# Patient Record
Sex: MALE | Race: WHITE | NOT HISPANIC OR LATINO | Employment: FULL TIME
[De-identification: names, ages, dates, MRNs, and addresses within clinical notes are randomized per-mention and may not be internally consistent; named-entity substitution may affect disease eponyms.]

---

## 2022-06-08 ENCOUNTER — TRANSCRIBE ORDERS (OUTPATIENT)
Dept: OTHER | Age: 52
End: 2022-06-08
Payer: COMMERCIAL

## 2022-06-08 DIAGNOSIS — G35 MS (MULTIPLE SCLEROSIS) (H): Primary | ICD-10-CM

## 2022-08-15 ENCOUNTER — OFFICE VISIT (OUTPATIENT)
Dept: NEUROLOGY | Facility: CLINIC | Age: 52
End: 2022-08-15
Attending: PSYCHIATRY & NEUROLOGY
Payer: COMMERCIAL

## 2022-08-15 VITALS — HEART RATE: 71 BPM | DIASTOLIC BLOOD PRESSURE: 75 MMHG | SYSTOLIC BLOOD PRESSURE: 122 MMHG

## 2022-08-15 DIAGNOSIS — E53.8 FOLATE DEFICIENCY: ICD-10-CM

## 2022-08-15 DIAGNOSIS — E55.9 VITAMIN D DEFICIENCY: ICD-10-CM

## 2022-08-15 DIAGNOSIS — G35 MS (MULTIPLE SCLEROSIS) (H): Primary | ICD-10-CM

## 2022-08-15 DIAGNOSIS — R53.82 CHRONIC FATIGUE: ICD-10-CM

## 2022-08-15 PROCEDURE — 2894A VOIDCORRECT: CPT | Performed by: PSYCHIATRY & NEUROLOGY

## 2022-08-15 PROCEDURE — 99215 OFFICE O/P EST HI 40 MIN: CPT | Performed by: PSYCHIATRY & NEUROLOGY

## 2022-08-15 RX ORDER — FOLIC ACID 1 MG/1
1000 TABLET ORAL DAILY
Qty: 30 TABLET | Refills: 4 | Status: SHIPPED | OUTPATIENT
Start: 2022-08-15 | End: 2022-08-15

## 2022-08-15 RX ORDER — LEVOTHYROXINE SODIUM 75 UG/1
75 TABLET ORAL DAILY
COMMUNITY
Start: 2022-07-28

## 2022-08-15 RX ORDER — FINGOLIMOD HCL 0.5 MG/1
0.5 CAPSULE ORAL DAILY
Qty: 30 CAPSULE | Refills: 11 | Status: SHIPPED | OUTPATIENT
Start: 2022-08-15 | End: 2023-03-17

## 2022-08-15 RX ORDER — FINGOLIMOD HCL 0.5 MG/1
0.5 CAPSULE ORAL DAILY
COMMUNITY
Start: 2022-08-03 | End: 2022-08-15

## 2022-08-15 RX ORDER — IBUPROFEN 600 MG/1
TABLET, FILM COATED ORAL
COMMUNITY
Start: 2021-12-02 | End: 2022-12-16

## 2022-08-15 RX ORDER — ACETAMINOPHEN 500 MG
TABLET ORAL
COMMUNITY
Start: 2021-09-30 | End: 2022-12-16

## 2022-08-15 RX ORDER — PREGABALIN 25 MG/1
CAPSULE ORAL
COMMUNITY
Start: 2021-11-29 | End: 2022-09-16

## 2022-08-15 RX ORDER — FOLIC ACID 1 MG/1
1000 TABLET ORAL DAILY
Qty: 30 TABLET | Refills: 4 | Status: SHIPPED | OUTPATIENT
Start: 2022-08-15 | End: 2023-01-04

## 2022-08-15 RX ORDER — FOLIC ACID 1 MG/1
1000 TABLET ORAL DAILY
COMMUNITY
Start: 2022-06-05 | End: 2022-08-15

## 2022-08-15 NOTE — PROGRESS NOTES
"Date of Service: 8/15/2022    [facility] Neurology   MS Clinic Follow-up     Subjective: 51-year-old man with hypothyroidism, venous insufficiency who presents in follow-up for multiple sclerosis.  He is accompanied by his wife, but provides history independently.    He has continued to take Gilenya.  He does not report any substantial side effects from the medication.  However his wife does notice a cough.  This is intermittent throughout the day.    He has not struggled with recurrent illnesses.  He has not had COVID.  He has not been able to complete the COVID vaccination series.  Recall that he had new onset of left upper extremity symptoms following the first dose of his Moderna vaccination.    He continues to have dysesthesias in the left upper extremity.  This is an intermittent feeling that he has a blood pressure cuff on the forearm.  This is becoming less frequent with time.  His left hand feels asleep.  His left hand strength has improved from a 5 pound  to a 63 pound .  However this is substantially weaker than his right.  He is able to type okay.    He is taking Lyrica 50 mg twice per day and wonders if he still needs this medication.  His current symptoms are manageable.    His wife reports that his energy level is lower.  He does experience some irritability.  He was diagnosed with mild sleep apnea, but has not pursued treatment.      Disease onset: age 50, LUE dysesthesias  Last relapse: \"    DMD hx:   gilenya 8/2021-present    No Known Allergies    Current Outpatient Medications   Medication     acetaminophen (TYLENOL) 500 MG tablet     folic acid (FOLVITE) 1 MG tablet     GILENYA 0.5 MG capsule     ibuprofen (ADVIL/MOTRIN) 600 MG tablet     levothyroxine (SYNTHROID/LEVOTHROID) 75 MCG tablet     pregabalin (LYRICA) 25 MG capsule     vitamin D3 (CHOLECALCIFEROL) 1.25 MG (08301 UT) capsule     No current facility-administered medications for this visit.        Past medical, surgical, social and " family history was personally reviewed. Pertinent details noted above.     Physical Examination:   /75 (BP Location: Right arm, Patient Position: Sitting)   Pulse 71     General: no acute distress  Cranial nerves:   VFFC  PERRL w/no RAPD  EOM full w/no HEATHER   Face symmetric  Hearing intact  No dysarthria   Motor:   Tone is normal   Bulk is normal     R L  Deltoid  5 5  Biceps  5 5  Triceps 5 5  Wrist ext 5 5  Finger ext 5 4 *limited by pain  Finger abd 5 5    Hip flexion 5 5  Knee flexion 5 5  Knee ext 5 5  Ankle d/f 5 5    Reflexes: 2+ UE and patella, 1+ achilles, babinski absent bilaterally  Sensory: vibration is mod reduced in the right toe and ankle, mild knee, mild red L toe, JPS normal in the toes   Romberg is absent  Coordination: no ataxia or dysmetria  Gait: normal base and stride, tandem gait is intact, able to balance on one foot for 5 seconds    Tests/Imaging:   CSF neg ocb    CECIL virus Ab positive  Vitamin D 66  Folate 3    Abs lymph 490 - 7/7/2022  lft's wnl     MRI brain   2021 - personal review, multiple periventricular and juxtacortical lesions, gd-   12/2021 - no new lesions, gd-     MRI cervical spine   2021 - multiple lesions in cervical cord at the level of c3, left lat/dorsal cord lesion is gd+   12/2021 - no new lesions, gd-, decreased size of lesion at c3    MRI thoracic spine   2021 - remarkable for 4 lesions, gd-       Assessment: 51-year-old man with relapsing remitting multiple sclerosis who appears to be clinically stable on Gilenya.  He is due for radiologic surveillance in December of this year.  Blood work was reviewed and reveals no evidence of toxicity.    We discussed a number of lifestyle changes that can be helpful for multiple sclerosis.  This includes dietary changes, benefits of exercise, and the benefit of addressing obstructive sleep apnea.    He may benefit from visiting with a therapist.  This will be discussed again at his next appointment.    Plan:   -Continue  Gilenya  - Continue vitamin D  - Gradually reduce Lyrica by 1 capsule/day, though may resume if neuropathic pain worsens with decreased use  - Continue folic acid  - Follow-up after MRI is completed in December    Note was completed with the assistance of Dragon Fluency software which can often result in accidental word substitutions.     A total of 50 minutes on the date of service were spent in the care of this patient.   Lorelei Mendez MD on 8/15/2022 at 9:01 AM

## 2022-08-15 NOTE — PATIENT INSTRUCTIONS
Continue gilenya     MRI in December    Continue vitamin D and folic acid    Lyrica - reduce by 1 capsule every week  Let me know if you end up needing a new prescription    Follow up after MRI         Covid vaccine - try to get pfizer   Okay to get shingrix

## 2022-08-15 NOTE — NURSING NOTE
Chief Complaint   Patient presents with     Multiple Sclerosis     Referred by CHASE Bailey on 8/15/2022 at 9:01 AM

## 2022-08-28 ENCOUNTER — HEALTH MAINTENANCE LETTER (OUTPATIENT)
Age: 52
End: 2022-08-28

## 2022-12-07 ENCOUNTER — TELEPHONE (OUTPATIENT)
Dept: NEUROLOGY | Facility: CLINIC | Age: 52
End: 2022-12-07

## 2022-12-07 NOTE — TELEPHONE ENCOUNTER
Pharmacy wondering if pt can switch to generic of gilenya? Would be no copay. Rx as written is for brand name only.     Please advise    Luis Singh RN, BSN  [facility]rology

## 2022-12-07 NOTE — TELEPHONE ENCOUNTER
[facility] Call Center    Phone Message    May a detailed message be left on voicemail: yes     Reason for Call: Other: Jb from [employer] needs the prior authorization number for the two MRI's. The Brain MRI and Cervical Spine. Please call her at [phone]    Action Taken: Message routed to:  Other: neurology    Travel Screening: Not Applicable

## 2022-12-07 NOTE — TELEPHONE ENCOUNTER
Returned call to [facility] specialty pharmacy. Spoke to pharmacist Jeannine and informed that it is ok to dispense generic Gilenya.     Luis Singh, RN, BSN  [facility]

## 2022-12-07 NOTE — TELEPHONE ENCOUNTER
Pls call pharmacist Roman re RX for Jelinia (SP?) RX on file is for 5 mg, Brand name only. Is it OK for Dr Mendez to have them fill with a generic? It is FDA approved, been on the market for a couple months now and cost out of pocket to patient would be zero.  Pt reference number at pharmacy is [id number]  Pls call Roman at private voice mail [phone]  Or a colleague can be reached at [phone] ext [phone]

## 2022-12-07 NOTE — TELEPHONE ENCOUNTER
HARPREET for Jb ([facility] [employer]) with MRI authorization information below.     Auth Number:                          [id number]  Auth Date Range:                   12/06/22 - 02/03/23    Luis Singh RN, BSN  [facility]

## 2022-12-14 ENCOUNTER — HOSPITAL ENCOUNTER (OUTPATIENT)
Dept: MRI IMAGING | Facility: CLINIC | Age: 52
Discharge: HOME | End: 2022-12-14
Attending: PSYCHIATRY & NEUROLOGY
Payer: COMMERCIAL

## 2022-12-14 DIAGNOSIS — G35 MS (MULTIPLE SCLEROSIS) (H): ICD-10-CM

## 2022-12-14 PROCEDURE — 255N000002 HC RX 255 OP 636: Performed by: PSYCHIATRY & NEUROLOGY

## 2022-12-14 PROCEDURE — A9585 GADOBUTROL INJECTION: HCPCS | Performed by: PSYCHIATRY & NEUROLOGY

## 2022-12-14 PROCEDURE — 70553 MRI BRAIN STEM W/O & W/DYE: CPT

## 2022-12-14 RX ORDER — GADOBUTROL 604.72 MG/ML
10 INJECTION INTRAVENOUS ONCE
Status: COMPLETED | OUTPATIENT
Start: 2022-12-14 | End: 2022-12-14

## 2022-12-14 RX ADMIN — GADOBUTROL 10 ML: 604.72 INJECTION INTRAVENOUS at 09:47

## 2022-12-15 ENCOUNTER — TELEPHONE (OUTPATIENT)
Dept: NEUROLOGY | Facility: CLINIC | Age: 52
End: 2022-12-15

## 2022-12-15 NOTE — TELEPHONE ENCOUNTER
----- Message from Lorelei Mendez MD sent at 12/15/2022  1:17 PM CST -----  Can you assist in getting the prior mri brain into our pacs? I believe he has gone to [facility] - though I could be wrong Thanks, Lorelei Mendez MD on 12/15/2022 at 1:16 PM

## 2022-12-15 NOTE — RESULT ENCOUNTER NOTE
Can you assist in getting the prior mri brain into our pacs? I believe he has gone to [facility] - though I could be wrong Thanks, Lorelei Mendez MD on 12/15/2022 at 1:16 PM

## 2022-12-15 NOTE — TELEPHONE ENCOUNTER
Called [facility] in [place], [place] and spoke to Medical Records [phone]. Asked for [facility] medical records to check to see if pt had his brain MRI done there and per them, yes. Pt had 2 brain MRI done, one in June 2021 and the other in Dec 2021. I've asked them to push the images to pacs and to fax us the reports to fax [phone].    Still waiting on the images/reports from [facility]. Once images are pushed, will forward message to Dr. Mendez to let her know.    CHASE Alcantar on 12/15/2022 at 2:14 PM

## 2022-12-16 ENCOUNTER — LAB (OUTPATIENT)
Dept: LAB | Facility: CLINIC | Age: 52
End: 2022-12-16
Payer: COMMERCIAL

## 2022-12-16 ENCOUNTER — MEDICAL CORRESPONDENCE (OUTPATIENT)
Dept: NEUROLOGY | Facility: CLINIC | Age: 52
End: 2022-12-16

## 2022-12-16 ENCOUNTER — OFFICE VISIT (OUTPATIENT)
Dept: NEUROLOGY | Facility: CLINIC | Age: 52
End: 2022-12-16
Attending: PSYCHIATRY & NEUROLOGY
Payer: COMMERCIAL

## 2022-12-16 VITALS
SYSTOLIC BLOOD PRESSURE: 146 MMHG | DIASTOLIC BLOOD PRESSURE: 86 MMHG | HEART RATE: 65 BPM | WEIGHT: 315 LBS | OXYGEN SATURATION: 98 %

## 2022-12-16 DIAGNOSIS — E55.9 VITAMIN D DEFICIENCY: ICD-10-CM

## 2022-12-16 DIAGNOSIS — G35 MS (MULTIPLE SCLEROSIS) (H): ICD-10-CM

## 2022-12-16 DIAGNOSIS — G35 MS (MULTIPLE SCLEROSIS) (H): Primary | ICD-10-CM

## 2022-12-16 DIAGNOSIS — Z51.81 THERAPEUTIC DRUG MONITORING: ICD-10-CM

## 2022-12-16 LAB
BASOPHILS # BLD AUTO: 0.1 10E3/UL (ref 0–0.2)
BASOPHILS NFR BLD AUTO: 1 %
DEPRECATED CALCIDIOL+CALCIFEROL SERPL-MC: 66 UG/L (ref 20–75)
EOSINOPHIL # BLD AUTO: 0.1 10E3/UL (ref 0–0.7)
EOSINOPHIL NFR BLD AUTO: 1 %
ERYTHROCYTE [DISTWIDTH] IN BLOOD BY AUTOMATED COUNT: 12.6 % (ref 10–15)
HBV CORE AB SERPL QL IA: NONREACTIVE
HBV SURFACE AB SERPL IA-ACNC: 0.24 M[IU]/ML
HBV SURFACE AB SERPL IA-ACNC: NONREACTIVE M[IU]/ML
HBV SURFACE AG SERPL QL IA: NONREACTIVE
HCT VFR BLD AUTO: 45.3 % (ref 40–53)
HGB BLD-MCNC: 15.4 G/DL (ref 13.3–17.7)
IMM GRANULOCYTES # BLD: 0 10E3/UL
IMM GRANULOCYTES NFR BLD: 1 %
LYMPHOCYTES # BLD AUTO: 0.8 10E3/UL (ref 0.8–5.3)
LYMPHOCYTES NFR BLD AUTO: 12 %
MCH RBC QN AUTO: 32.7 PG (ref 26.5–33)
MCHC RBC AUTO-ENTMCNC: 34 G/DL (ref 31.5–36.5)
MCV RBC AUTO: 96 FL (ref 78–100)
MONOCYTES # BLD AUTO: 0.9 10E3/UL (ref 0–1.3)
MONOCYTES NFR BLD AUTO: 13 %
NEUTROPHILS # BLD AUTO: 4.8 10E3/UL (ref 1.6–8.3)
NEUTROPHILS NFR BLD AUTO: 72 %
NRBC # BLD AUTO: 0 10E3/UL
NRBC BLD AUTO-RTO: 0 /100
PLATELET # BLD AUTO: 307 10E3/UL (ref 150–450)
RBC # BLD AUTO: 4.71 10E6/UL (ref 4.4–5.9)
WBC # BLD AUTO: 6.6 10E3/UL (ref 4–11)

## 2022-12-16 PROCEDURE — 86704 HEP B CORE ANTIBODY TOTAL: CPT | Performed by: PATHOLOGY

## 2022-12-16 PROCEDURE — 99215 OFFICE O/P EST HI 40 MIN: CPT | Performed by: PSYCHIATRY & NEUROLOGY

## 2022-12-16 PROCEDURE — 86706 HEP B SURFACE ANTIBODY: CPT | Performed by: PATHOLOGY

## 2022-12-16 PROCEDURE — 82784 ASSAY IGA/IGD/IGG/IGM EACH: CPT | Performed by: PATHOLOGY

## 2022-12-16 PROCEDURE — 99000 SPECIMEN HANDLING OFFICE-LAB: CPT | Performed by: PATHOLOGY

## 2022-12-16 PROCEDURE — 82306 VITAMIN D 25 HYDROXY: CPT | Performed by: PATHOLOGY

## 2022-12-16 PROCEDURE — 85025 COMPLETE CBC W/AUTO DIFF WBC: CPT | Performed by: PATHOLOGY

## 2022-12-16 PROCEDURE — 87340 HEPATITIS B SURFACE AG IA: CPT | Performed by: PATHOLOGY

## 2022-12-16 PROCEDURE — 99211 OFF/OP EST MAY X REQ PHY/QHP: CPT | Performed by: PSYCHIATRY & NEUROLOGY

## 2022-12-16 PROCEDURE — 86787 VARICELLA-ZOSTER ANTIBODY: CPT | Performed by: PATHOLOGY

## 2022-12-16 PROCEDURE — 36415 COLL VENOUS BLD VENIPUNCTURE: CPT | Performed by: PATHOLOGY

## 2022-12-16 ASSESSMENT — PAIN SCALES - GENERAL: PAINLEVEL: NO PAIN (0)

## 2022-12-16 NOTE — TELEPHONE ENCOUNTER
Dr. Mendez,     Images were pushed to pacs from [facility].     We received the MRI reports as well and I've asked Carolann to scan the reports into the pt's chart for these MRIs are also scanned into the pt's chart as well.    Just a FYI, when you are searching for the pt in pacs, the last name should be put in as O'Malik and not Cristina (there is a apostrophe in his last name per [facility] records).    Please let me know if you are not able to pull him up. Thank you.    CHASE Alcantar on 12/16/2022 at 8:32 AM

## 2022-12-16 NOTE — LETTER
"12/16/2022       RE: Jace Cristina  [address] [address]     Dear Colleague,    Thank you for referring your patient, Jace Cristina, to the [facility] MULTIPLE SCLEROSIS CLINIC [place] at [facility]. Please see a copy of my visit note below.    Date of Service: 12/16/2022    [facility] Neurology   MS Clinic Follow-up     Subjective: 52-year-old man with hypothyroidism, venous insufficiency who presents in follow-up for multiple sclerosis.  He is accompanied by his wife, Lesa, but provides history independently.    He does not report any new symptoms concerning for an MS relapse.    He has continued to take Gilenya.  He denies any side effects.  He has not had any issues obtaining the medication.  There have been no interruptions in his therapy.    He has residual left upper extremity dysesthesias.  Recall that this happened after receiving the Moderna vaccination.    He has not had any recent illnesses.  He has not had COVID.    Disease onset: age 50, LUE dysesthesias  Last relapse: \"    DMD hx:   gilenya 8/2021-present    No Known Allergies    Current Outpatient Medications   Medication     folic acid (FOLVITE) 1 MG tablet     GILENYA 0.5 MG capsule     levothyroxine (SYNTHROID/LEVOTHROID) 75 MCG tablet     pregabalin (LYRICA) 50 MG capsule     vitamin D3 (CHOLECALCIFEROL) 1.25 MG (28418 UT) capsule     acetaminophen (TYLENOL) 500 MG tablet     diazepam (VALIUM) 5 MG tablet     ibuprofen (ADVIL/MOTRIN) 600 MG tablet     No current facility-administered medications for this visit.        Past medical, surgical, social and family history was personally reviewed. Pertinent details noted above.     Physical Examination:   BP (!) 146/86 (BP Location: Right arm, Patient Position: Sitting, Cuff Size: Adult Regular)   Pulse 65   Wt 145.2 kg (320 lb)   SpO2 98%     General: no acute distress  Cranial nerves:   VFFC  PERRL w/no RAPD  EOM full w/no " HEATHER   Face symmetric  Hearing intact  No dysarthria   Motor:   Tone is normal   Bulk is normal     R L  Deltoid  5 5  Biceps  5 5  Triceps 5 5  Wrist ext 5 5  Finger ext 5 5  Finger abd 5 5-    Hip flexion 5 5  Knee flexion 5 5  Knee ext 5 5  Ankle d/f 5 5    Reflexes: 2+ UE and patella, 1+ achilles, babinski absent bilaterally  Sensory: vibration is mod reduced in the right toe and ankle, mild red L toe, JPS normal in the toes   Romberg is absent  Coordination: no ataxia or dysmetria  Gait: normal base and stride, tandem gait is intact, able to balance on one foot for 5 seconds    Tests/Imaging:   CSF neg ocb  Serum mog neg    CECIL virus Ab positive  Vitamin D 68.5  Folate 3    Abs lymph 490 - 7/7/2022  lft's wnl     MRI brain   2021 - personal review, multiple periventricular and juxtacortical lesions, gd-   12/2021 - no new lesions, gd-   12/2022 - 3 new lesions, 1 faint gd+    MRI cervical spine   2021 - multiple lesions in cervical cord at the level of c3, left lat/dorsal cord lesion is gd+   12/2021 - no new lesions, gd-, decreased size of lesion at c3    MRI thoracic spine   2021 - remarkable for 4 lesions, gd-       Assessment: 52-year-old man with relapsing remitting multiple sclerosis who has been clinically stable on Gilenya, but does have evidence of radiologic progression.  Given the presence of 3 new lesions, 1 of which does take up contrast, I have recommended that he switch disease modifying therapies.    We discussed risks and benefits of an anti-CD20 therapy.  He was agreeable to switching to ocrelizumab.    We discussed risk of medication.  He was encouraged to get a flu vaccination and his Shingrix vaccination.  Recall that he had the reaction to the COVID vaccination in the past.  I have therefore recommended that he receive evusheld.    He will try to come off of pregabalin again, but if there are recurrence of dysesthesias he will remain on 50 mg twice per day.    Plan:   -Baseline blood work  for Ocrevus  - Complete MRI of the cervical and thoracic spine  - Stop Gilenya 2 weeks before first dose of Ocrevus  - Follow-up in 3 to 4 months    Note was completed with the assistance of Dragon Fluency software which can often result in accidental word substitutions.     A total of 40 minutes on the date of service were spent in the care of this patient.     Lorelei Mendez MD on 12/16/2022 at 10:52 AM

## 2022-12-16 NOTE — PATIENT INSTRUCTIONS
Your MRI brain was remarkable for 3 new lesions  One was enhancing     Shingrix, flu vaccines now     You are a candidate for evusheld    Start approval process for ocrevus   Baseline bloodwork today     You will stop gilenya 2 weeks before your first dose of ocrevus    Complete updated MRI cervical spine and thoracic spine     Follow up in 3-4 months

## 2022-12-16 NOTE — PROGRESS NOTES
"Date of Service: 12/16/2022    [facility] Neurology   MS Clinic Follow-up     Subjective: 52-year-old man with hypothyroidism, venous insufficiency who presents in follow-up for multiple sclerosis.  He is accompanied by his wife, Lesa, but provides history independently.    He does not report any new symptoms concerning for an MS relapse.    He has continued to take Gilenya.  He denies any side effects.  He has not had any issues obtaining the medication.  There have been no interruptions in his therapy.    He has residual left upper extremity dysesthesias.  Recall that this happened after receiving the Moderna vaccination.    He has not had any recent illnesses.  He has not had COVID.    Disease onset: age 50, LUE dysesthesias  Last relapse: \"    DMD hx:   gilenya 8/2021-present    No Known Allergies    Current Outpatient Medications   Medication     folic acid (FOLVITE) 1 MG tablet     GILENYA 0.5 MG capsule     levothyroxine (SYNTHROID/LEVOTHROID) 75 MCG tablet     pregabalin (LYRICA) 50 MG capsule     vitamin D3 (CHOLECALCIFEROL) 1.25 MG (16737 UT) capsule     acetaminophen (TYLENOL) 500 MG tablet     diazepam (VALIUM) 5 MG tablet     ibuprofen (ADVIL/MOTRIN) 600 MG tablet     No current facility-administered medications for this visit.        Past medical, surgical, social and family history was personally reviewed. Pertinent details noted above.     Physical Examination:   BP (!) 146/86 (BP Location: Right arm, Patient Position: Sitting, Cuff Size: Adult Regular)   Pulse 65   Wt 145.2 kg (320 lb)   SpO2 98%     General: no acute distress  Cranial nerves:   VFFC  PERRL w/no RAPD  EOM full w/no HEATHER   Face symmetric  Hearing intact  No dysarthria   Motor:   Tone is normal   Bulk is normal     R L  Deltoid  5 5  Biceps  5 5  Triceps 5 5  Wrist ext 5 5  Finger ext 5 5  Finger abd 5 5-    Hip flexion 5 5  Knee flexion 5 5  Knee ext 5 5  Ankle d/f 5 5    Reflexes: 2+ UE and patella, 1+ achilles, babinski absent " bilaterally  Sensory: vibration is mod reduced in the right toe and ankle, mild red L toe, JPS normal in the toes   Romberg is absent  Coordination: no ataxia or dysmetria  Gait: normal base and stride, tandem gait is intact, able to balance on one foot for 5 seconds    Tests/Imaging:   CSF neg ocb  Serum mog neg    CECIL virus Ab positive  Vitamin D 68.5  Folate 3    Abs lymph 490 - 7/7/2022  lft's wnl     MRI brain   2021 - personal review, multiple periventricular and juxtacortical lesions, gd-   12/2021 - no new lesions, gd-   12/2022 - 3 new lesions, 1 faint gd+    MRI cervical spine   2021 - multiple lesions in cervical cord at the level of c3, left lat/dorsal cord lesion is gd+   12/2021 - no new lesions, gd-, decreased size of lesion at c3    MRI thoracic spine   2021 - remarkable for 4 lesions, gd-       Assessment: 52-year-old man with relapsing remitting multiple sclerosis who has been clinically stable on Gilenya, but does have evidence of radiologic progression.  Given the presence of 3 new lesions, 1 of which does take up contrast, I have recommended that he switch disease modifying therapies.    We discussed risks and benefits of an anti-CD20 therapy.  He was agreeable to switching to ocrelizumab.    We discussed risk of medication.  He was encouraged to get a flu vaccination and his Shingrix vaccination.  Recall that he had the reaction to the COVID vaccination in the past.  I have therefore recommended that he receive evusheld.    He will try to come off of pregabalin again, but if there are recurrence of dysesthesias he will remain on 50 mg twice per day.    Plan:   -Baseline blood work for Ocrevus  - Complete MRI of the cervical and thoracic spine  - Stop Gilenya 2 weeks before first dose of Ocrevus  - Follow-up in 3 to 4 months    Note was completed with the assistance of Dragon Fluency software which can often result in accidental word substitutions.     A total of 40 minutes on the date of service  were spent in the care of this patient.   Lorelei Mendez MD on 12/16/2022 at 10:52 AM

## 2022-12-19 LAB
IGA SERPL-MCNC: 360 MG/DL (ref 84–499)
IGG SERPL-MCNC: 1116 MG/DL (ref 610–1616)
IGM SERPL-MCNC: 57 MG/DL (ref 35–242)
VZV IGG SER QL IA: 1337 INDEX
VZV IGG SER QL IA: POSITIVE

## 2022-12-20 ENCOUNTER — TELEPHONE (OUTPATIENT)
Dept: NEUROLOGY | Facility: CLINIC | Age: 52
End: 2022-12-20

## 2022-12-20 DIAGNOSIS — G35 MULTIPLE SCLEROSIS (H): ICD-10-CM

## 2022-12-20 NOTE — TELEPHONE ENCOUNTER
Jace to begin Ocrevus infusions. Baseline labs pending.  Ocrevus start form completed in clinic and faxed to [employer]. Jace prefers to infuse at [facility].  Therapy plan routed to Dr. Mendez for signature. Once signed, Jace will be instructed to schedule at [facility].  Jace directed to get vaccines at this time and will need to wait at least 2-3 weeks between vaccines and first Ocrevus dose.    Breanne Chester RN

## 2022-12-21 RX ORDER — DIPHENHYDRAMINE HCL 25 MG
50 CAPSULE ORAL ONCE
Status: CANCELLED | OUTPATIENT
Start: 2022-12-21

## 2022-12-21 RX ORDER — DIPHENHYDRAMINE HYDROCHLORIDE 50 MG/ML
50 INJECTION INTRAMUSCULAR; INTRAVENOUS
Status: CANCELLED
Start: 2022-12-21

## 2022-12-21 RX ORDER — ALBUTEROL SULFATE 90 UG/1
1-2 AEROSOL, METERED RESPIRATORY (INHALATION)
Status: CANCELLED
Start: 2022-12-21

## 2022-12-21 RX ORDER — HEPARIN SODIUM,PORCINE 10 UNIT/ML
5 VIAL (ML) INTRAVENOUS
Status: CANCELLED | OUTPATIENT
Start: 2022-12-21

## 2022-12-21 RX ORDER — HEPARIN SODIUM (PORCINE) LOCK FLUSH IV SOLN 100 UNIT/ML 100 UNIT/ML
5 SOLUTION INTRAVENOUS
Status: CANCELLED | OUTPATIENT
Start: 2022-12-21

## 2022-12-21 RX ORDER — MEPERIDINE HYDROCHLORIDE 25 MG/ML
25 INJECTION INTRAMUSCULAR; INTRAVENOUS; SUBCUTANEOUS EVERY 30 MIN PRN
Status: CANCELLED | OUTPATIENT
Start: 2022-12-21

## 2022-12-21 RX ORDER — ALBUTEROL SULFATE 0.83 MG/ML
2.5 SOLUTION RESPIRATORY (INHALATION)
Status: CANCELLED | OUTPATIENT
Start: 2022-12-21

## 2022-12-21 RX ORDER — METHYLPREDNISOLONE SODIUM SUCCINATE 125 MG/2ML
125 INJECTION, POWDER, LYOPHILIZED, FOR SOLUTION INTRAMUSCULAR; INTRAVENOUS
Status: CANCELLED
Start: 2022-12-21

## 2022-12-21 RX ORDER — EPINEPHRINE 1 MG/ML
0.3 INJECTION, SOLUTION, CONCENTRATE INTRAVENOUS EVERY 5 MIN PRN
Status: CANCELLED | OUTPATIENT
Start: 2022-12-21

## 2022-12-21 RX ORDER — METHYLPREDNISOLONE SODIUM SUCCINATE 125 MG/2ML
125 INJECTION, POWDER, LYOPHILIZED, FOR SOLUTION INTRAMUSCULAR; INTRAVENOUS ONCE
Status: CANCELLED | OUTPATIENT
Start: 2022-12-21

## 2022-12-21 RX ORDER — ACETAMINOPHEN 325 MG/1
650 TABLET ORAL ONCE
Status: CANCELLED | OUTPATIENT
Start: 2022-12-21

## 2022-12-22 LAB — SCANNED LAB RESULT: ABNORMAL

## 2022-12-23 ENCOUNTER — TELEPHONE (OUTPATIENT)
Dept: NEUROLOGY | Facility: CLINIC | Age: 52
End: 2022-12-23

## 2022-12-23 NOTE — TELEPHONE ENCOUNTER
Prior Authorization Infusion/Clinic Administered Request    Location: [facility]  Diagnosis and ICD:Multiple Sclerosis, G35  Drug/Therapy: Ocrevus 300 mg day 1 and day 15    Previously Tried and Failed Therapies: Juliana    Date of provider note with supporting information: 12/16/22    Urgency (When is the patient scheduled?):     Would you like to include any research articles?         If yes please call [phone] for further instructions about sending that information

## 2023-01-03 DIAGNOSIS — E53.8 FOLATE DEFICIENCY: ICD-10-CM

## 2023-01-04 RX ORDER — FOLIC ACID 1 MG/1
1000 TABLET ORAL DAILY
Qty: 30 TABLET | Refills: 4 | Status: SHIPPED | OUTPATIENT
Start: 2023-01-04 | End: 2023-08-14

## 2023-01-13 ENCOUNTER — HOSPITAL ENCOUNTER (OUTPATIENT)
Dept: MRI IMAGING | Facility: CLINIC | Age: 53
Discharge: HOME | End: 2023-01-13
Attending: PSYCHIATRY & NEUROLOGY
Payer: COMMERCIAL

## 2023-01-13 DIAGNOSIS — G35 MS (MULTIPLE SCLEROSIS) (H): ICD-10-CM

## 2023-01-13 PROCEDURE — 255N000002 HC RX 255 OP 636: Performed by: PSYCHIATRY & NEUROLOGY

## 2023-01-13 PROCEDURE — 72156 MRI NECK SPINE W/O & W/DYE: CPT

## 2023-01-13 PROCEDURE — A9585 GADOBUTROL INJECTION: HCPCS | Performed by: PSYCHIATRY & NEUROLOGY

## 2023-01-13 PROCEDURE — 72157 MRI CHEST SPINE W/O & W/DYE: CPT

## 2023-01-13 RX ORDER — GADOBUTROL 604.72 MG/ML
14.5 INJECTION INTRAVENOUS ONCE
Status: COMPLETED | OUTPATIENT
Start: 2023-01-13 | End: 2023-01-13

## 2023-01-13 RX ADMIN — GADOBUTROL 14.5 ML: 604.72 INJECTION INTRAVENOUS at 07:14

## 2023-01-17 ENCOUNTER — TELEPHONE (OUTPATIENT)
Dept: NEUROLOGY | Facility: CLINIC | Age: 53
End: 2023-01-17
Payer: COMMERCIAL

## 2023-01-17 NOTE — TELEPHONE ENCOUNTER
----- Message from Lorelei Mendez MD sent at 1/17/2023  2:16 PM CST -----  Please assist in getting prior mri's thanks, Lorelei Mendez MD on 1/17/2023 at 2:16 PM

## 2023-01-17 NOTE — TELEPHONE ENCOUNTER
Called [facility] to push images to pacs.    Images are in pacs.     Thanks.    CHASE Alcantar on 1/17/2023 at 2:52 PM

## 2023-01-20 ENCOUNTER — INFUSION THERAPY VISIT (OUTPATIENT)
Dept: INFUSION THERAPY | Facility: CLINIC | Age: 53
End: 2023-01-20
Attending: PSYCHIATRY & NEUROLOGY
Payer: COMMERCIAL

## 2023-01-20 VITALS
HEART RATE: 77 BPM | OXYGEN SATURATION: 95 % | SYSTOLIC BLOOD PRESSURE: 131 MMHG | DIASTOLIC BLOOD PRESSURE: 86 MMHG | WEIGHT: 315 LBS | TEMPERATURE: 97.8 F | RESPIRATION RATE: 16 BRPM

## 2023-01-20 DIAGNOSIS — G35 MULTIPLE SCLEROSIS (H): Primary | ICD-10-CM

## 2023-01-20 PROCEDURE — 96375 TX/PRO/DX INJ NEW DRUG ADDON: CPT

## 2023-01-20 PROCEDURE — 258N000003 HC RX IP 258 OP 636: Performed by: PSYCHIATRY & NEUROLOGY

## 2023-01-20 PROCEDURE — 250N000013 HC RX MED GY IP 250 OP 250 PS 637: Performed by: PSYCHIATRY & NEUROLOGY

## 2023-01-20 PROCEDURE — 96361 HYDRATE IV INFUSION ADD-ON: CPT

## 2023-01-20 PROCEDURE — 250N000011 HC RX IP 250 OP 636: Performed by: PSYCHIATRY & NEUROLOGY

## 2023-01-20 PROCEDURE — 96365 THER/PROPH/DIAG IV INF INIT: CPT

## 2023-01-20 RX ORDER — ALBUTEROL SULFATE 90 UG/1
1-2 AEROSOL, METERED RESPIRATORY (INHALATION)
Status: CANCELLED
Start: 2023-02-03

## 2023-01-20 RX ORDER — METHYLPREDNISOLONE SODIUM SUCCINATE 125 MG/2ML
125 INJECTION, POWDER, LYOPHILIZED, FOR SOLUTION INTRAMUSCULAR; INTRAVENOUS ONCE
Status: CANCELLED | OUTPATIENT
Start: 2023-02-03

## 2023-01-20 RX ORDER — ALBUTEROL SULFATE 0.83 MG/ML
2.5 SOLUTION RESPIRATORY (INHALATION)
Status: CANCELLED | OUTPATIENT
Start: 2023-02-03

## 2023-01-20 RX ORDER — HEPARIN SODIUM (PORCINE) LOCK FLUSH IV SOLN 100 UNIT/ML 100 UNIT/ML
5 SOLUTION INTRAVENOUS
Status: CANCELLED | OUTPATIENT
Start: 2023-02-03

## 2023-01-20 RX ORDER — HEPARIN SODIUM,PORCINE 10 UNIT/ML
5 VIAL (ML) INTRAVENOUS
Status: CANCELLED | OUTPATIENT
Start: 2023-02-03

## 2023-01-20 RX ORDER — METHYLPREDNISOLONE SODIUM SUCCINATE 125 MG/2ML
125 INJECTION, POWDER, LYOPHILIZED, FOR SOLUTION INTRAMUSCULAR; INTRAVENOUS
Status: CANCELLED
Start: 2023-02-03

## 2023-01-20 RX ORDER — MEPERIDINE HYDROCHLORIDE 25 MG/ML
25 INJECTION INTRAMUSCULAR; INTRAVENOUS; SUBCUTANEOUS EVERY 30 MIN PRN
Status: CANCELLED | OUTPATIENT
Start: 2023-02-03

## 2023-01-20 RX ORDER — ACETAMINOPHEN 325 MG/1
650 TABLET ORAL ONCE
Status: COMPLETED | OUTPATIENT
Start: 2023-01-20 | End: 2023-01-20

## 2023-01-20 RX ORDER — METHYLPREDNISOLONE SODIUM SUCCINATE 125 MG/2ML
125 INJECTION, POWDER, LYOPHILIZED, FOR SOLUTION INTRAMUSCULAR; INTRAVENOUS ONCE
Status: COMPLETED | OUTPATIENT
Start: 2023-01-20 | End: 2023-01-20

## 2023-01-20 RX ORDER — DIPHENHYDRAMINE HCL 25 MG
50 CAPSULE ORAL ONCE
Status: COMPLETED | OUTPATIENT
Start: 2023-01-20 | End: 2023-01-20

## 2023-01-20 RX ORDER — DIPHENHYDRAMINE HYDROCHLORIDE 50 MG/ML
50 INJECTION INTRAMUSCULAR; INTRAVENOUS
Status: CANCELLED
Start: 2023-02-03

## 2023-01-20 RX ORDER — EPINEPHRINE 1 MG/ML
0.3 INJECTION, SOLUTION INTRAMUSCULAR; SUBCUTANEOUS EVERY 5 MIN PRN
Status: CANCELLED | OUTPATIENT
Start: 2023-02-03

## 2023-01-20 RX ORDER — DIPHENHYDRAMINE HCL 25 MG
50 CAPSULE ORAL ONCE
Status: CANCELLED | OUTPATIENT
Start: 2023-02-03

## 2023-01-20 RX ORDER — ACETAMINOPHEN 325 MG/1
650 TABLET ORAL ONCE
Status: CANCELLED | OUTPATIENT
Start: 2023-02-03

## 2023-01-20 RX ADMIN — DIPHENHYDRAMINE HYDROCHLORIDE 50 MG: 25 CAPSULE ORAL at 08:35

## 2023-01-20 RX ADMIN — ACETAMINOPHEN 650 MG: 325 TABLET ORAL at 08:35

## 2023-01-20 RX ADMIN — OCRELIZUMAB 300 MG: 300 INJECTION INTRAVENOUS at 08:55

## 2023-01-20 RX ADMIN — METHYLPREDNISOLONE SODIUM SUCCINATE 125 MG: 125 INJECTION, POWDER, FOR SOLUTION INTRAMUSCULAR; INTRAVENOUS at 08:36

## 2023-01-20 RX ADMIN — SODIUM CHLORIDE 250 ML: 9 INJECTION, SOLUTION INTRAVENOUS at 08:55

## 2023-01-20 NOTE — PROGRESS NOTES
Infusion Nursing Note:  Jace DAYNA Crsitina presents today for Ocrevus - first dose.    Patient seen by provider today: No   present during visit today: Not Applicable.    Note: Patient educated on medication, possible side effects, symptoms of reaction. Patient tolerated infusion well increasing by 30ml/hr, to a max rate of 180/hr.     Intravenous Access:  Peripheral IV placed.    Treatment Conditions:  Biological Infusion Checklist:  ~~~ NOTE: If the patient answers yes to any of the questions below, hold the infusion and contact ordering provider or on-call provider.    1. Have you recently had an elevated temperature, fever, chills, productive cough, coughing for 3 weeks or longer or hemoptysis, abnormal vital signs, night sweats,  chest pain or have you noticed a decrease in your appetite, unexplained weight loss or fatigue? No  2. Do you have any open wounds or new incisions? No  3. Do you have any recent or upcoming hospitalizations, surgeries or dental procedures? No  4. Do you currently have or recently have had any signs of illness or infection or are you on any antibiotics? No  5. Have you had any new, sudden or worsening abdominal pain? No  6. Have you or anyone in your household received a live vaccination in the past 4 weeks? Please note:  No live vaccines while on biologic/chemotherapy until 6 months after the last treatment.  Patient can receive the flu vaccine (shot only) and the pneumovax.  It is optimal for the patient to get these vaccines mid cycle, but they can be given at any time as long as it is not on the day of the infusion. No  7. Have you recently been diagnosed with any new nervous system diseases (ie. Multiple sclerosis, Guillain [place], seizures, neurological changes) or cancer diagnosis? No  8. Are you on any form of radiation or chemotherapy? No  9. Are you pregnant or breast feeding or do you have plans of pregnancy in the future? No  10. Have you been having any signs of  worsening depression or suicidal ideations?  (benlysta only) No  11. Have there been any other new onset medical symptoms? No      Post Infusion Assessment:  Patient tolerated infusion without incident.  Patient observed for 60 minutes post ocrevus infusion per protocol.  Blood return noted pre and post infusion.  Site patent and intact, free from redness, edema or discomfort.  No evidence of extravasations.  Access discontinued per protocol.    Biologic Infusion Post Education: Call the triage nurse at your clinic or seek medical attention if you have chills and/or temperature greater than or equal to 100.5, uncontrolled nausea/vomiting, diarrhea, constipation, dizziness, shortness of breath, chest pain, heart palpitations, weakness or any other new or concerning symptoms, questions or concerns.  You cannot have any live virus vaccines prior to or during treatment or up to 6 months post infusion.  If you have an upcoming surgery, medical procedure or dental procedure during treatment, this should be discussed with your ordering physician and your surgeon/dentist.  If you are having any concerning symptom, if you are unsure if you should get your next infusion or wish to speak to a provider before your next infusion, please call your care coordinator or triage nurse at your clinic to notify them so we can adequately serve you.     Discharge Plan:   Discharge instructions reviewed with: Patient.  Patient and/or family verbalized understanding of discharge instructions and all questions answered.  Copy of AVS reviewed with patient and/or family.  Patient will return 2/3/23 for next appointment.  Patient discharged in stable condition accompanied by: self.  Departure Mode: Ambulatory.      Jeannine Cardoza RN

## 2023-01-20 NOTE — TELEPHONE ENCOUNTER
Please notify [employer]ient that I have personally compared studies from 2021 to 2023. There are no definite new spinal cord lesions.   Lorelei Mendez MD on 1/20/2023 at 10:38 AM

## 2023-01-20 NOTE — TELEPHONE ENCOUNTER
[facility] Call Center    Phone Message    May a detailed message be left on voicemail: yes     Reason for Call: Other: Pt returned nissed call from [employer]. Writer shared message with pt and pt was good with this message.     No need to return call unless there are other concerns.    Action Taken: Message routed to:  Other: [facility]    Travel Screening: Not Applicable

## 2023-02-09 ENCOUNTER — CARE COORDINATION (OUTPATIENT)
Dept: PHARMACY | Facility: CLINIC | Age: 53
End: 2023-02-09
Payer: COMMERCIAL

## 2023-02-09 NOTE — PROGRESS NOTES
Referred to [facility]    Jace Cristina, 1970  Medication (name, frequency and route):  Ocrevus Loading dose Day 15   Start of Care Date: 2/13/23 (Confirmed with patient)  Infusion location: [facility]  Skilled Nursing will be provided by: [facility]  @ [phone]    EDEL Joya

## 2023-02-13 ENCOUNTER — DOCUMENTATION ONLY (OUTPATIENT)
Dept: PHARMACY | Facility: CLINIC | Age: 53
End: 2023-02-13
Payer: COMMERCIAL

## 2023-02-13 NOTE — PROGRESS NOTES
Skilled Nurse visit in the [facility]fusion Site to administer Ocrevus infusion 300 mg IV (2nd loading dose) .  No recent elevated temperature, fever, chills, productive cough, coughing for 3 weeks or longer or hemoptysis, abnormal vital signs, night sweats, chest pain. No  decrease in your appetite, unexplained weight loss or fatigue.  No other new onset medical symptoms.  Current weight 318 lbs.  Peripheral IV placed in right Lower Forearm, 1 attempt.  Pre medicated with Acetaminophen 650 mg po, Diphenhydramine 50 mg po, and Methylprednisolone 125mg IVP. Labs drawn: none ordered. Infusion completed without complication or reaction. Pt reports no delayed reactions or side effects after his first dose.     Cori Romano, SALEEMN, RN  [phone]  [email or web address]

## 2023-02-20 ENCOUNTER — DOCUMENTATION ONLY (OUTPATIENT)
Dept: NEUROLOGY | Facility: CLINIC | Age: 53
End: 2023-02-20
Payer: COMMERCIAL

## 2023-02-20 NOTE — PROGRESS NOTES
Home infusion forms received and placed in Dr. Mendez's folder for review and signature.   Jesús Patterson EMT 02/20/2023 12:05

## 2023-03-17 ENCOUNTER — LAB (OUTPATIENT)
Dept: LAB | Facility: CLINIC | Age: 53
End: 2023-03-17
Payer: COMMERCIAL

## 2023-03-17 ENCOUNTER — OFFICE VISIT (OUTPATIENT)
Dept: NEUROLOGY | Facility: CLINIC | Age: 53
End: 2023-03-17
Attending: PSYCHIATRY & NEUROLOGY
Payer: COMMERCIAL

## 2023-03-17 VITALS — HEART RATE: 71 BPM | SYSTOLIC BLOOD PRESSURE: 145 MMHG | OXYGEN SATURATION: 97 % | DIASTOLIC BLOOD PRESSURE: 91 MMHG

## 2023-03-17 DIAGNOSIS — G35 MS (MULTIPLE SCLEROSIS) (H): Primary | ICD-10-CM

## 2023-03-17 DIAGNOSIS — E53.8 FOLATE DEFICIENCY: ICD-10-CM

## 2023-03-17 DIAGNOSIS — G35 MS (MULTIPLE SCLEROSIS) (H): ICD-10-CM

## 2023-03-17 DIAGNOSIS — Z51.81 THERAPEUTIC DRUG MONITORING: ICD-10-CM

## 2023-03-17 LAB
BASOPHILS # BLD AUTO: 0.1 10E3/UL (ref 0–0.2)
BASOPHILS NFR BLD AUTO: 1 %
CD19 B CELL COMMENT: ABNORMAL
CD19 CELLS # BLD: <1 CELLS/UL (ref 107–698)
CD19 CELLS NFR BLD: <1 % (ref 6–27)
EOSINOPHIL # BLD AUTO: 0.1 10E3/UL (ref 0–0.7)
EOSINOPHIL NFR BLD AUTO: 2 %
ERYTHROCYTE [DISTWIDTH] IN BLOOD BY AUTOMATED COUNT: 12.7 % (ref 10–15)
FOLATE SERPL-MCNC: 35.1 NG/ML (ref 4.6–34.8)
HCT VFR BLD AUTO: 41.9 % (ref 40–53)
HGB BLD-MCNC: 14.7 G/DL (ref 13.3–17.7)
IMM GRANULOCYTES # BLD: 0 10E3/UL
IMM GRANULOCYTES NFR BLD: 0 %
LYMPHOCYTES # BLD AUTO: 1.1 10E3/UL (ref 0.8–5.3)
LYMPHOCYTES NFR BLD AUTO: 18 %
MCH RBC QN AUTO: 33 PG (ref 26.5–33)
MCHC RBC AUTO-ENTMCNC: 35.1 G/DL (ref 31.5–36.5)
MCV RBC AUTO: 94 FL (ref 78–100)
MONOCYTES # BLD AUTO: 0.7 10E3/UL (ref 0–1.3)
MONOCYTES NFR BLD AUTO: 12 %
NEUTROPHILS # BLD AUTO: 4.1 10E3/UL (ref 1.6–8.3)
NEUTROPHILS NFR BLD AUTO: 67 %
NRBC # BLD AUTO: 0 10E3/UL
NRBC BLD AUTO-RTO: 0 /100
PLATELET # BLD AUTO: 307 10E3/UL (ref 150–450)
RBC # BLD AUTO: 4.45 10E6/UL (ref 4.4–5.9)
WBC # BLD AUTO: 6.1 10E3/UL (ref 4–11)

## 2023-03-17 PROCEDURE — 99212 OFFICE O/P EST SF 10 MIN: CPT | Performed by: PSYCHIATRY & NEUROLOGY

## 2023-03-17 PROCEDURE — 82784 ASSAY IGA/IGD/IGG/IGM EACH: CPT | Performed by: PATHOLOGY

## 2023-03-17 PROCEDURE — 99000 SPECIMEN HANDLING OFFICE-LAB: CPT | Performed by: PATHOLOGY

## 2023-03-17 PROCEDURE — 86355 B CELLS TOTAL COUNT: CPT | Performed by: PATHOLOGY

## 2023-03-17 PROCEDURE — 99214 OFFICE O/P EST MOD 30 MIN: CPT | Performed by: PSYCHIATRY & NEUROLOGY

## 2023-03-17 PROCEDURE — G0463 HOSPITAL OUTPT CLINIC VISIT: HCPCS

## 2023-03-17 PROCEDURE — 36415 COLL VENOUS BLD VENIPUNCTURE: CPT | Performed by: PATHOLOGY

## 2023-03-17 PROCEDURE — 85025 COMPLETE CBC W/AUTO DIFF WBC: CPT | Performed by: PATHOLOGY

## 2023-03-17 PROCEDURE — 82746 ASSAY OF FOLIC ACID SERUM: CPT | Performed by: PATHOLOGY

## 2023-03-17 RX ORDER — HEPARIN SODIUM,PORCINE 10 UNIT/ML
5 VIAL (ML) INTRAVENOUS
Status: CANCELLED | OUTPATIENT
Start: 2023-07-10

## 2023-03-17 RX ORDER — ACETAMINOPHEN 325 MG/1
650 TABLET ORAL ONCE
Status: CANCELLED | OUTPATIENT
Start: 2023-07-10

## 2023-03-17 RX ORDER — METHYLPREDNISOLONE SODIUM SUCCINATE 125 MG/2ML
125 INJECTION, POWDER, LYOPHILIZED, FOR SOLUTION INTRAMUSCULAR; INTRAVENOUS
Status: CANCELLED
Start: 2023-07-10

## 2023-03-17 RX ORDER — ALBUTEROL SULFATE 90 UG/1
1-2 AEROSOL, METERED RESPIRATORY (INHALATION)
Status: CANCELLED
Start: 2023-07-10

## 2023-03-17 RX ORDER — HEPARIN SODIUM (PORCINE) LOCK FLUSH IV SOLN 100 UNIT/ML 100 UNIT/ML
5 SOLUTION INTRAVENOUS
Status: CANCELLED | OUTPATIENT
Start: 2023-07-10

## 2023-03-17 RX ORDER — EPINEPHRINE 1 MG/ML
0.3 INJECTION, SOLUTION, CONCENTRATE INTRAVENOUS EVERY 5 MIN PRN
Status: CANCELLED | OUTPATIENT
Start: 2023-07-10

## 2023-03-17 RX ORDER — DIPHENHYDRAMINE HCL 25 MG
50 CAPSULE ORAL ONCE
Status: CANCELLED | OUTPATIENT
Start: 2023-07-10

## 2023-03-17 RX ORDER — DIPHENHYDRAMINE HYDROCHLORIDE 50 MG/ML
50 INJECTION INTRAMUSCULAR; INTRAVENOUS
Status: CANCELLED
Start: 2023-07-10

## 2023-03-17 RX ORDER — TIZANIDINE 2 MG/1
1-2 TABLET ORAL 3 TIMES DAILY
Qty: 30 TABLET | Refills: 2 | Status: SHIPPED | OUTPATIENT
Start: 2023-03-17

## 2023-03-17 RX ORDER — ALBUTEROL SULFATE 0.83 MG/ML
2.5 SOLUTION RESPIRATORY (INHALATION)
Status: CANCELLED | OUTPATIENT
Start: 2023-07-10

## 2023-03-17 RX ORDER — MEPERIDINE HYDROCHLORIDE 25 MG/ML
25 INJECTION INTRAMUSCULAR; INTRAVENOUS; SUBCUTANEOUS EVERY 30 MIN PRN
Status: CANCELLED | OUTPATIENT
Start: 2023-07-10

## 2023-03-17 RX ORDER — METHYLPREDNISOLONE SODIUM SUCCINATE 125 MG/2ML
125 INJECTION, POWDER, LYOPHILIZED, FOR SOLUTION INTRAMUSCULAR; INTRAVENOUS ONCE
Status: CANCELLED
Start: 2023-07-10 | End: 2023-07-10

## 2023-03-17 ASSESSMENT — PAIN SCALES - GENERAL: PAINLEVEL: NO PAIN (0)

## 2023-03-17 NOTE — LETTER
"3/17/2023       RE: Jace Cristina  [address] [address]         Dear Colleague,    Thank you for referring your patient, Jace Cristina, to the [facility] MULTIPLE SCLEROSIS CLINIC [place] at [facility]. Please see a copy of my visit note below.    Date of Service: 3/17/2023    [facility] Neurology   MS Clinic Follow-up     Subjective: 52-year-old man with hypothyroidism, venous insufficiency who presents in follow-up for multiple sclerosis.  He is accompanied by his wife, Lesa, but provides history independently.    He does not report any symptoms concerning for an MS relapse.    Prior to his Ocrevus infusion he was having episodic left foot discomfort.  It resolved after the infusion.    However, since the infusion he has had some left neck discomfort.  This is episodic.  Tylenol is helpful.  He describes the pain as aching in nature.    He has a history of a cramp-like sensation under the right jaw.  This has been intermittent for the past year.    He did receive Ocrevus as planned.  He received his first dose in January, but before going to his second dose he was routed to home infusion due to insurance coverage.  He did receive a big bill for the first dose, but has not heard anything recently.  He suffered a headache after the first dose that resolved by the following day.  After the second dose steroids were injected and he suffered from insomnia.    There have been no significant changes to his energy levels.    Disease onset: age 50, LUE dysesthesias  Last relapse: \"    DMD hx:   gilenya 8/2021-12/2022, radiologic progression   ocrevus 1/20/2023- present, LD 2/13/2023    No Known Allergies    Current Outpatient Medications   Medication     folic acid (FOLVITE) 1 MG tablet     levothyroxine (SYNTHROID/LEVOTHROID) 75 MCG tablet     tiZANidine (ZANAFLEX) 2 MG tablet     vitamin D3 (CHOLECALCIFEROL) 1.25 MG (43208 UT) capsule     No " current facility-administered medications for this visit.        Past medical, surgical, social and family history was personally reviewed. Pertinent details noted above.     Physical Examination:   BP (!) 145/91 (BP Location: Right arm, Patient Position: Sitting, Cuff Size: Adult Large)   Pulse 71   SpO2 97%     General: no acute distress    Tests/Imaging:   CSF neg ocb  Serum mog neg    CECIL virus Ab positive 1.38  Vitamin D 66  Folate 3    Abs lymph 800  igg 1116    MRI brain   2021 - personal review, multiple periventricular and juxtacortical lesions, gd-   12/2021 - no new lesions, gd-   12/2022 - 3 new lesions, 1 faint gd+    MRI cervical spine   2021 - multiple lesions in cervical cord at the level of c3, left lat/dorsal cord lesion is gd+   12/2021 - no new lesions, gd-, decreased size of lesion at c3  1/2023 - no new lesions, gd-    MRI thoracic spine   2021 - remarkable for 4 lesions, gd-   1/2023 - no new lesions, gd-       Assessment: 52-year-old man with relapsing remitting multiple sclerosis who had radiologic progression on Gilenya.  He is now status post ocrelizumab.  He has tolerated therapy well.    We discussed expectations for treatment going forward.    I would like him to undergo an MRI brain in July to ensure no evidence of ongoing activity.    Left neck discomfort could be related to his cervical cord lesion.  I recommended a trial of tizanidine.    Plan:   -Blood work to rule out hematologic or immunologic toxicity from ocrelizumab  - MRI brain in July  - Continue with Ocrevus standard dosing, though the steroids to be administered over 30 minutes to decrease chance of insomnia  - Tizanidine as needed for neck discomfort  - Follow-up after MRI is completed      Note was completed with the assistance of Dragon Fluency software which can often result in accidental word substitutions.     A total of 30 minutes on the date of service were spent in the care of this patient.   Lorelei Stevens  MD Andrea on 3/17/2023 at 9:48 AM        Again, thank you for allowing me to participate in the care of your patient.      Sincerely,    Lorelei Mendez MD

## 2023-03-17 NOTE — PATIENT INSTRUCTIONS
Blood work today     Try taking tizanidine as needed for the left neck tension   It can make you sleepy - be cautious with the first dose     MRI brain in late June/early July     Ocrevus in July     See me after MRI

## 2023-03-17 NOTE — PROGRESS NOTES
"Date of Service: 3/17/2023    [facility] Neurology   MS Clinic Follow-up     Subjective: 52-year-old man with hypothyroidism, venous insufficiency who presents in follow-up for multiple sclerosis.  He is accompanied by his wife, Lesa, but provides history independently.    He does not report any symptoms concerning for an MS relapse.    Prior to his Ocrevus infusion he was having episodic left foot discomfort.  It resolved after the infusion.    However, since the infusion he has had some left neck discomfort.  This is episodic.  Tylenol is helpful.  He describes the pain as aching in nature.    He has a history of a cramp-like sensation under the right jaw.  This has been intermittent for the past year.    He did receive Ocrevus as planned.  He received his first dose in January, but before going to his second dose he was routed to home infusion due to insurance coverage.  He did receive a big bill for the first dose, but has not heard anything recently.  He suffered a headache after the first dose that resolved by the following day.  After the second dose steroids were injected and he suffered from insomnia.    There have been no significant changes to his energy levels.    Disease onset: age 50, LUE dysesthesias  Last relapse: \"    DMD hx:   gilenya 8/2021-12/2022, radiologic progression   ocrevus 1/20/2023- present, LD 2/13/2023    No Known Allergies    Current Outpatient Medications   Medication     folic acid (FOLVITE) 1 MG tablet     levothyroxine (SYNTHROID/LEVOTHROID) 75 MCG tablet     tiZANidine (ZANAFLEX) 2 MG tablet     vitamin D3 (CHOLECALCIFEROL) 1.25 MG (09987 UT) capsule     No current facility-administered medications for this visit.        Past medical, surgical, social and family history was personally reviewed. Pertinent details noted above.     Physical Examination:   BP (!) 145/91 (BP Location: Right arm, Patient Position: Sitting, Cuff Size: Adult Large)   Pulse 71   SpO2 97%     General: no " acute distress    Tests/Imaging:   CSF neg ocb  Serum mog neg    CECIL virus Ab positive 1.38  Vitamin D 66  Folate 3    Abs lymph 800  igg 1116    MRI brain   2021 - personal review, multiple periventricular and juxtacortical lesions, gd-   12/2021 - no new lesions, gd-   12/2022 - 3 new lesions, 1 faint gd+    MRI cervical spine   2021 - multiple lesions in cervical cord at the level of c3, left lat/dorsal cord lesion is gd+   12/2021 - no new lesions, gd-, decreased size of lesion at c3  1/2023 - no new lesions, gd-    MRI thoracic spine   2021 - remarkable for 4 lesions, gd-   1/2023 - no new lesions, gd-       Assessment: 52-year-old man with relapsing remitting multiple sclerosis who had radiologic progression on Gilenya.  He is now status post ocrelizumab.  He has tolerated therapy well.    We discussed expectations for treatment going forward.    I would like him to undergo an MRI brain in July to ensure no evidence of ongoing activity.    Left neck discomfort could be related to his cervical cord lesion.  I recommended a trial of tizanidine.    Plan:   -Blood work to rule out hematologic or immunologic toxicity from ocrelizumab  - MRI brain in July  - Continue with Ocrevus standard dosing, though the steroids to be administered over 30 minutes to decrease chance of insomnia  - Tizanidine as needed for neck discomfort  - Follow-up after MRI is completed      Note was completed with the assistance of Dragon Fluency software which can often result in accidental word substitutions.     A total of 30 minutes on the date of service were spent in the care of this patient.   Lorelei Mendez MD on 3/17/2023 at 9:48 AM

## 2023-03-17 NOTE — NURSING NOTE
Chief Complaint   Patient presents with     MS     RECHECK     MS follow up      Vitals were taken and medications were reconciled.   Jesús Patterson, EMT  9:54 AM

## 2023-03-20 LAB — IGG SERPL-MCNC: 985 MG/DL (ref 610–1616)

## 2023-06-28 ENCOUNTER — DOCUMENTATION ONLY (OUTPATIENT)
Dept: NEUROLOGY | Facility: CLINIC | Age: 53
End: 2023-06-28
Payer: COMMERCIAL

## 2023-06-28 ENCOUNTER — MEDICAL CORRESPONDENCE (OUTPATIENT)
Dept: HEALTH INFORMATION MANAGEMENT | Facility: CLINIC | Age: 53
End: 2023-06-28
Payer: COMMERCIAL

## 2023-06-28 NOTE — PROGRESS NOTES
[employer] infsuion form received, form placed in Dr. Mendez's folder for review and signature.  Jesús Patterson EMT 06/28/2023 1:30PM

## 2023-06-29 NOTE — PROGRESS NOTES
[place] home infusion form has been signed and faxed back at [phone]   Jesús Patterson EMT 06/29/2023 7:42AM

## 2023-07-14 ENCOUNTER — HOSPITAL ENCOUNTER (OUTPATIENT)
Dept: MRI IMAGING | Facility: CLINIC | Age: 53
Discharge: HOME | End: 2023-07-14
Attending: PSYCHIATRY & NEUROLOGY | Admitting: PSYCHIATRY & NEUROLOGY
Payer: COMMERCIAL

## 2023-07-14 DIAGNOSIS — G35 MS (MULTIPLE SCLEROSIS) (H): ICD-10-CM

## 2023-07-14 PROCEDURE — 255N000002 HC RX 255 OP 636: Performed by: PSYCHIATRY & NEUROLOGY

## 2023-07-14 PROCEDURE — 70553 MRI BRAIN STEM W/O & W/DYE: CPT

## 2023-07-14 PROCEDURE — A9585 GADOBUTROL INJECTION: HCPCS | Performed by: PSYCHIATRY & NEUROLOGY

## 2023-07-14 RX ORDER — GADOBUTROL 604.72 MG/ML
15 INJECTION INTRAVENOUS ONCE
Status: COMPLETED | OUTPATIENT
Start: 2023-07-14 | End: 2023-07-14

## 2023-07-14 RX ADMIN — GADOBUTROL 15 ML: 604.72 INJECTION INTRAVENOUS at 16:29

## 2023-07-25 ENCOUNTER — TELEPHONE (OUTPATIENT)
Dept: NEUROLOGY | Facility: CLINIC | Age: 53
End: 2023-07-25
Payer: COMMERCIAL

## 2023-07-25 DIAGNOSIS — E55.9 VITAMIN D DEFICIENCY: ICD-10-CM

## 2023-07-25 NOTE — TELEPHONE ENCOUNTER
Please call Pt to schedule for next available follow up visit with Dr. Mendez.    Thank you,  Ladi Pulido MA on 7/25/2023 at 1:15 PM

## 2023-07-25 NOTE — TELEPHONE ENCOUNTER
Rx refill request for vitamin D3 (CHOLECALCIFEROL) 1.25 MG (92269 UT) capsule   Last refill; 08/15/22 12 capsule 3 refills     Last follow-up; 03/17/23 Next follow-up; None, msg sent to  to call Pt for an appt.     Medication T'd for review and signature  Ladi Pulido MA on 7/25/2023 at 1:13 PM

## 2023-08-02 ENCOUNTER — LAB (OUTPATIENT)
Dept: LAB | Facility: CLINIC | Age: 53
End: 2023-08-02
Payer: COMMERCIAL

## 2023-08-02 ENCOUNTER — DOCUMENTATION ONLY (OUTPATIENT)
Dept: NEUROLOGY | Facility: CLINIC | Age: 53
End: 2023-08-02
Payer: COMMERCIAL

## 2023-08-02 ENCOUNTER — OFFICE VISIT (OUTPATIENT)
Dept: NEUROLOGY | Facility: CLINIC | Age: 53
End: 2023-08-02
Attending: PSYCHIATRY & NEUROLOGY
Payer: COMMERCIAL

## 2023-08-02 VITALS
DIASTOLIC BLOOD PRESSURE: 84 MMHG | HEART RATE: 63 BPM | OXYGEN SATURATION: 96 % | SYSTOLIC BLOOD PRESSURE: 127 MMHG | RESPIRATION RATE: 16 BRPM

## 2023-08-02 DIAGNOSIS — G35 MS (MULTIPLE SCLEROSIS) (H): Primary | ICD-10-CM

## 2023-08-02 DIAGNOSIS — G35 MS (MULTIPLE SCLEROSIS) (H): ICD-10-CM

## 2023-08-02 DIAGNOSIS — E55.9 VITAMIN D DEFICIENCY: ICD-10-CM

## 2023-08-02 DIAGNOSIS — M25.511 ACUTE PAIN OF RIGHT SHOULDER: ICD-10-CM

## 2023-08-02 DIAGNOSIS — Z51.81 THERAPEUTIC DRUG MONITORING: ICD-10-CM

## 2023-08-02 LAB
BASOPHILS # BLD AUTO: 0.1 10E3/UL (ref 0–0.2)
BASOPHILS NFR BLD AUTO: 1 %
EOSINOPHIL # BLD AUTO: 0.2 10E3/UL (ref 0–0.7)
EOSINOPHIL NFR BLD AUTO: 2 %
ERYTHROCYTE [DISTWIDTH] IN BLOOD BY AUTOMATED COUNT: 12.5 % (ref 10–15)
HCT VFR BLD AUTO: 43.6 % (ref 40–53)
HGB BLD-MCNC: 14.8 G/DL (ref 13.3–17.7)
IMM GRANULOCYTES # BLD: 0 10E3/UL
IMM GRANULOCYTES NFR BLD: 0 %
LYMPHOCYTES # BLD AUTO: 2 10E3/UL (ref 0.8–5.3)
LYMPHOCYTES NFR BLD AUTO: 22 %
MCH RBC QN AUTO: 32.9 PG (ref 26.5–33)
MCHC RBC AUTO-ENTMCNC: 33.9 G/DL (ref 31.5–36.5)
MCV RBC AUTO: 97 FL (ref 78–100)
MONOCYTES # BLD AUTO: 1 10E3/UL (ref 0–1.3)
MONOCYTES NFR BLD AUTO: 10 %
NEUTROPHILS # BLD AUTO: 5.9 10E3/UL (ref 1.6–8.3)
NEUTROPHILS NFR BLD AUTO: 65 %
NRBC # BLD AUTO: 0 10E3/UL
NRBC BLD AUTO-RTO: 0 /100
PLATELET # BLD AUTO: 333 10E3/UL (ref 150–450)
RBC # BLD AUTO: 4.5 10E6/UL (ref 4.4–5.9)
WBC # BLD AUTO: 9.2 10E3/UL (ref 4–11)

## 2023-08-02 PROCEDURE — 86355 B CELLS TOTAL COUNT: CPT | Performed by: PSYCHIATRY & NEUROLOGY

## 2023-08-02 PROCEDURE — 99214 OFFICE O/P EST MOD 30 MIN: CPT | Performed by: PSYCHIATRY & NEUROLOGY

## 2023-08-02 PROCEDURE — 99213 OFFICE O/P EST LOW 20 MIN: CPT | Performed by: PSYCHIATRY & NEUROLOGY

## 2023-08-02 PROCEDURE — 82784 ASSAY IGA/IGD/IGG/IGM EACH: CPT | Performed by: PSYCHIATRY & NEUROLOGY

## 2023-08-02 PROCEDURE — 36415 COLL VENOUS BLD VENIPUNCTURE: CPT | Performed by: PATHOLOGY

## 2023-08-02 PROCEDURE — 85025 COMPLETE CBC W/AUTO DIFF WBC: CPT | Performed by: PATHOLOGY

## 2023-08-02 PROCEDURE — 99000 SPECIMEN HANDLING OFFICE-LAB: CPT | Performed by: PATHOLOGY

## 2023-08-02 ASSESSMENT — PAIN SCALES - GENERAL: PAINLEVEL: NO PAIN (0)

## 2023-08-02 NOTE — PATIENT INSTRUCTIONS
Blood work today     If b cells are returning, then I will make a case for you to get ocrevus every 5 months   (Kesimpta can be a back-up)    Physical therapy for right shoulder    Follow up in 6 months

## 2023-08-02 NOTE — LETTER
"8/2/2023       RE: Jace Cristina  [address] [facility] [address]     Dear Colleague,    Thank you for referring your patient, Jace Cristina, to the [facility] MULTIPLE SCLEROSIS CLINIC [place] at [facility]. Please see a copy of my visit note below.    Date of Service: 8/2/2023    [facility] Neurology   MS Clinic Follow-up     Subjective: 52-year-old man with hypothyroidism, venous insufficiency who presents in follow-up for multiple sclerosis.  He is accompanied by his wife, Lesa, but provides history independently.    He reports return of a cough like sensation in the left arm associated with some dysesthesias in the left fingers.  He has his Ocrevus scheduled for next Friday.  The cough like sensation was gone for the initial couple months after Ocrevus.  It has become more prominent in the past several weeks.    He has become more tired over the past few months.  His wife confirms this.    He has not had any significant infections.    He has not had any recurrence of the right-sided neck spasms, but will occasionally have the right sided jaw cramps.  He has not taken tizanidine because episodes have become less frequent.    He reports right-sided shoulder pain.    Disease onset: age 50, LUE dysesthesias  Last relapse: \"    DMD hx:   gilenya 8/2021-12/2022, radiologic progression   ocrevus 1/20/2023- present, LD 2/13/2023    No Known Allergies    Current Outpatient Medications   Medication    folic acid (FOLVITE) 1 MG tablet    levothyroxine (SYNTHROID/LEVOTHROID) 75 MCG tablet    tiZANidine (ZANAFLEX) 2 MG tablet    vitamin D3 (CHOLECALCIFEROL) 1.25 MG (84693 UT) capsule     No current facility-administered medications for this visit.        Past medical, surgical, social and family history was personally reviewed. Pertinent details noted above.     Physical Examination:   /84   Pulse 63   Resp 16   SpO2 96%     General: no acute " distress  Cranial nerves:   VFFC  PERRL w/no RAPD  EOM full w/no HEATHER   Face symmetric  Hearing intact  No dysarthria   Motor:   Tone is normal   Bulk is normal                           R          L  Deltoid             5          5  Biceps             5          5  Triceps            5          5  Wrist ext          5          5  Finger ext        5          5  Finger abd       5          5     Hip flexion       5          5-  Knee flexion    5          5  Knee ext          5          5  Ankle d/f          5          5     Reflexes: 2+ UE and patella, 1+ achilles, babinski absent bilaterally  Sensory: vibration is mod reduced in the right toe and ankle, mild red L toe, JPS normal in the toes   Romberg is absent  Coordination: no ataxia or dysmetria  Gait: normal base and stride, tandem gait is intact, able to balance on one foot for 5 seconds    Tests/Imaging:   CSF neg ocb  Serum mog neg    CECIL virus Ab positive 1.38  Vitamin D 66  Folate 3 -> 30s    Abs lymph 1100  igg 985    MRI brain   2021 - personal review, multiple periventricular and juxtacortical lesions, gd-   12/2021 - no new lesions, gd-   12/2022 - 3 new lesions, 1 faint gd+  7/2023- no new lesions, gd-     MRI cervical spine   2021 - multiple lesions in cervical cord at the level of c3, left lat/dorsal cord lesion is gd+   12/2021 - no new lesions, gd-, decreased size of lesion at c3  1/2023 - no new lesions, gd-    MRI thoracic spine   2021 - remarkable for 4 lesions, gd-   1/2023 - no new lesions, gd-       Assessment: 52-year-old man with relapsing remitting multiple sclerosis who had radiologic progression on Gilenya.  He is now status post ocrelizumab.  He has tolerated therapy well, but due to the return of some symptoms I do have concerns that his B cells may be recovering before his infusion date.    I have recommended that he do blood work today to assess for return of B cells.  He should proceed with his Ocrevus infusion as scheduled next  Friday.    If his B cells are recovering to a substantial degree I would recommend either switching Ocrevus to every 5 months, or switching to kesimpta.  This was preliminarily discussed today.    Plan:   -Blood work for treatment monitoring today  - Physical therapy for right shoulder  - Follow-up in 6 months      Note was completed with the assistance of Dragon Fluency software which can often result in accidental word substitutions.     A total of 30 minutes on the date of service were spent in the care of this patient.   Lorelei Mendez MD on 8/6/2023 at 8:42 PM    Again, thank you for allowing me to participate in the care of your patient.      Sincerely,    Lorelei Mendez MD

## 2023-08-02 NOTE — PROGRESS NOTES
"Date of Service: 8/2/2023    [facility] Neurology   MS Clinic Follow-up     Subjective: 52-year-old man with hypothyroidism, venous insufficiency who presents in follow-up for multiple sclerosis.  He is accompanied by his wife, Lesa, but provides history independently.    He reports return of a cough like sensation in the left arm associated with some dysesthesias in the left fingers.  He has his Ocrevus scheduled for next Friday.  The cough like sensation was gone for the initial couple months after Ocrevus.  It has become more prominent in the past several weeks.    He has become more tired over the past few months.  His wife confirms this.    He has not had any significant infections.    He has not had any recurrence of the right-sided neck spasms, but will occasionally have the right sided jaw cramps.  He has not taken tizanidine because episodes have become less frequent.    He reports right-sided shoulder pain.    Disease onset: age 50, LUE dysesthesias  Last relapse: \"    DMD hx:   gilenya 8/2021-12/2022, radiologic progression   ocrevus 1/20/2023- present, LD 2/13/2023    No Known Allergies    Current Outpatient Medications   Medication    folic acid (FOLVITE) 1 MG tablet    levothyroxine (SYNTHROID/LEVOTHROID) 75 MCG tablet    tiZANidine (ZANAFLEX) 2 MG tablet    vitamin D3 (CHOLECALCIFEROL) 1.25 MG (91815 UT) capsule     No current facility-administered medications for this visit.        Past medical, surgical, social and family history was personally reviewed. Pertinent details noted above.     Physical Examination:   /84   Pulse 63   Resp 16   SpO2 96%     General: no acute distress  Cranial nerves:   VFFC  PERRL w/no RAPD  EOM full w/no HEATHER   Face symmetric  Hearing intact  No dysarthria   Motor:   Tone is normal   Bulk is normal                           R          L  Deltoid             5          5  Biceps             5          5  Triceps            5          5  Wrist ext          5          " 5  Finger ext        5          5  Finger abd       5          5     Hip flexion       5          5-  Knee flexion    5          5  Knee ext          5          5  Ankle d/f          5          5     Reflexes: 2+ UE and patella, 1+ achilles, babinski absent bilaterally  Sensory: vibration is mod reduced in the right toe and ankle, mild red L toe, JPS normal in the toes   Romberg is absent  Coordination: no ataxia or dysmetria  Gait: normal base and stride, tandem gait is intact, able to balance on one foot for 5 seconds    Tests/Imaging:   CSF neg ocb  Serum mog neg    CECIL virus Ab positive 1.38  Vitamin D 66  Folate 3 -> 30s    Abs lymph 1100  igg 985    MRI brain   2021 - personal review, multiple periventricular and juxtacortical lesions, gd-   12/2021 - no new lesions, gd-   12/2022 - 3 new lesions, 1 faint gd+  7/2023- no new lesions, gd-     MRI cervical spine   2021 - multiple lesions in cervical cord at the level of c3, left lat/dorsal cord lesion is gd+   12/2021 - no new lesions, gd-, decreased size of lesion at c3  1/2023 - no new lesions, gd-    MRI thoracic spine   2021 - remarkable for 4 lesions, gd-   1/2023 - no new lesions, gd-       Assessment: 52-year-old man with relapsing remitting multiple sclerosis who had radiologic progression on Gilenya.  He is now status post ocrelizumab.  He has tolerated therapy well, but due to the return of some symptoms I do have concerns that his B cells may be recovering before his infusion date.    I have recommended that he do blood work today to assess for return of B cells.  He should proceed with his Ocrevus infusion as scheduled next Friday.    If his B cells are recovering to a substantial degree I would recommend either switching Ocrevus to every 5 months, or switching to kesimpta.  This was preliminarily discussed today.    Plan:   -Blood work for treatment monitoring today  - Physical therapy for right shoulder  - Follow-up in 6 months      Note was  completed with the assistance of Dragon Fluency software which can often result in accidental word substitutions.     A total of 30 minutes on the date of service were spent in the care of this patient.   Lorelei Mendez MD on 8/6/2023 at 8:42 PM

## 2023-08-02 NOTE — PROGRESS NOTES
[place] infusion form has been received and placed in Dr. Mendez's folder for review and signature.   Jesús Patterson EMT 08/02/2023 11:38AM

## 2023-08-03 LAB
CD19 B CELL COMMENT: NORMAL
CD19 CELLS # BLD: 134 CELLS/UL (ref 107–698)
CD19 CELLS NFR BLD: 7 % (ref 6–27)
IGG SERPL-MCNC: 1048 MG/DL (ref 610–1616)

## 2023-08-04 NOTE — PROGRESS NOTES
[place] home infusion form has been signed and faxed back at [phone].  Jesús Patterson EMT 08/04/2023 2:50PM

## 2023-08-11 ENCOUNTER — DOCUMENTATION ONLY (OUTPATIENT)
Dept: PHARMACY | Facility: CLINIC | Age: 53
End: 2023-08-11
Payer: COMMERCIAL

## 2023-08-11 NOTE — PROGRESS NOTES
Skilled Nurse visit in the [facility] to administer Ocrevus 600 mg IV (1st full dose) .  No recent elevated temperature, fever, chills, productive cough, coughing for 3 weeks or longer or hemoptysis, abnormal vital signs, night sweats, chest pain. No  decrease in your appetite, unexplained weight loss or fatigue.  No other new onset medical symptoms.  Current weight 315 lbs.  Peripheral IV placed in right mid forearm, 1 attempt.  Pre medicated with Acetaminophen 650 mg po, Diphenhydramine 50 mg po, and Methylprednisolone 125mg IVP. Labs drawn: none ordered. Infusion completed without complication or reaction. Pt reports no delayed reactions or side effects after his first two initial doses.      SALEEM JaramilloN, RN  [phone]  [email or web address]

## 2023-08-13 ENCOUNTER — MYC MEDICAL ADVICE (OUTPATIENT)
Dept: NEUROLOGY | Facility: CLINIC | Age: 53
End: 2023-08-13
Payer: COMMERCIAL

## 2023-08-13 DIAGNOSIS — E53.8 FOLATE DEFICIENCY: ICD-10-CM

## 2023-08-14 NOTE — TELEPHONE ENCOUNTER
Patient requesting refill of their folic acid; Patient was last seen in August and has no follow up appointment scheduled yet with Dr Mendez. Pended rx to Dr Mendez for signature and will send electronically to the pharmacy once signed.    Breanna Velazco RN

## 2023-08-15 RX ORDER — FOLIC ACID 1 MG/1
1000 TABLET ORAL DAILY
Qty: 30 TABLET | Refills: 4 | Status: SHIPPED | OUTPATIENT
Start: 2023-08-15 | End: 2023-12-15

## 2023-11-08 ENCOUNTER — MEDICAL CORRESPONDENCE (OUTPATIENT)
Dept: NEUROLOGY | Facility: CLINIC | Age: 53
End: 2023-11-08
Payer: COMMERCIAL

## 2023-11-09 ENCOUNTER — DOCUMENTATION ONLY (OUTPATIENT)
Dept: NEUROLOGY | Facility: CLINIC | Age: 53
End: 2023-11-09
Payer: COMMERCIAL

## 2023-11-09 NOTE — PROGRESS NOTES
Prescriber orders received from [facility] infusion, forms placed in Dr. Mendez's folder for review and signature.   Jesús Patterson EMT 11/09/2023 11:14AM

## 2023-11-13 NOTE — PROGRESS NOTES
Prescriber orders have been signed and faxed back at [phone].  Jesús Patterson EMT 11/13/2023 8:28AM

## 2023-12-03 ENCOUNTER — HEALTH MAINTENANCE LETTER (OUTPATIENT)
Age: 53
End: 2023-12-03

## 2023-12-12 ENCOUNTER — LAB (OUTPATIENT)
Dept: LAB | Facility: CLINIC | Age: 53
End: 2023-12-12
Payer: COMMERCIAL

## 2023-12-12 DIAGNOSIS — G35 MS (MULTIPLE SCLEROSIS) (H): ICD-10-CM

## 2023-12-12 DIAGNOSIS — Z51.81 THERAPEUTIC DRUG MONITORING: ICD-10-CM

## 2023-12-12 LAB
BASOPHILS # BLD AUTO: 0.1 10E3/UL (ref 0–0.2)
BASOPHILS NFR BLD AUTO: 1 %
CD19 B CELL COMMENT: ABNORMAL
CD19 CELLS # BLD: 1 CELLS/UL (ref 107–698)
CD19 CELLS NFR BLD: <1 % (ref 6–27)
EOSINOPHIL # BLD AUTO: 0.2 10E3/UL (ref 0–0.7)
EOSINOPHIL NFR BLD AUTO: 3 %
ERYTHROCYTE [DISTWIDTH] IN BLOOD BY AUTOMATED COUNT: 12 % (ref 10–15)
HCT VFR BLD AUTO: 44.1 % (ref 40–53)
HGB BLD-MCNC: 14.8 G/DL (ref 13.3–17.7)
IMM GRANULOCYTES # BLD: 0 10E3/UL
IMM GRANULOCYTES NFR BLD: 0 %
LYMPHOCYTES # BLD AUTO: 1.3 10E3/UL (ref 0.8–5.3)
LYMPHOCYTES NFR BLD AUTO: 16 %
MCH RBC QN AUTO: 33.6 PG (ref 26.5–33)
MCHC RBC AUTO-ENTMCNC: 33.6 G/DL (ref 31.5–36.5)
MCV RBC AUTO: 100 FL (ref 78–100)
MONOCYTES # BLD AUTO: 0.9 10E3/UL (ref 0–1.3)
MONOCYTES NFR BLD AUTO: 10 %
NEUTROPHILS # BLD AUTO: 6 10E3/UL (ref 1.6–8.3)
NEUTROPHILS NFR BLD AUTO: 71 %
PLATELET # BLD AUTO: 315 10E3/UL (ref 150–450)
RBC # BLD AUTO: 4.41 10E6/UL (ref 4.4–5.9)
WBC # BLD AUTO: 8.5 10E3/UL (ref 4–11)

## 2023-12-12 PROCEDURE — 36415 COLL VENOUS BLD VENIPUNCTURE: CPT

## 2023-12-12 PROCEDURE — 86355 B CELLS TOTAL COUNT: CPT

## 2023-12-12 PROCEDURE — 82784 ASSAY IGA/IGD/IGG/IGM EACH: CPT

## 2023-12-12 PROCEDURE — 85025 COMPLETE CBC W/AUTO DIFF WBC: CPT

## 2023-12-13 LAB — IGG SERPL-MCNC: 965 MG/DL (ref 610–1616)

## 2023-12-15 ENCOUNTER — OFFICE VISIT (OUTPATIENT)
Dept: NEUROLOGY | Facility: CLINIC | Age: 53
End: 2023-12-15
Attending: PSYCHIATRY & NEUROLOGY
Payer: COMMERCIAL

## 2023-12-15 VITALS
SYSTOLIC BLOOD PRESSURE: 122 MMHG | WEIGHT: 315 LBS | DIASTOLIC BLOOD PRESSURE: 82 MMHG | OXYGEN SATURATION: 96 % | HEART RATE: 65 BPM

## 2023-12-15 DIAGNOSIS — E53.8 FOLATE DEFICIENCY: ICD-10-CM

## 2023-12-15 DIAGNOSIS — Z51.81 THERAPEUTIC DRUG MONITORING: ICD-10-CM

## 2023-12-15 DIAGNOSIS — G35 MS (MULTIPLE SCLEROSIS) (H): Primary | ICD-10-CM

## 2023-12-15 PROCEDURE — 99214 OFFICE O/P EST MOD 30 MIN: CPT | Mod: GC | Performed by: PSYCHIATRY & NEUROLOGY

## 2023-12-15 PROCEDURE — 99214 OFFICE O/P EST MOD 30 MIN: CPT | Performed by: PSYCHIATRY & NEUROLOGY

## 2023-12-15 RX ORDER — FOLIC ACID 1 MG/1
1000 TABLET ORAL DAILY
Qty: 90 TABLET | Refills: 3 | Status: SHIPPED | OUTPATIENT
Start: 2023-12-15 | End: 2024-07-03

## 2023-12-15 ASSESSMENT — PAIN SCALES - GENERAL: PAINLEVEL: NO PAIN (0)

## 2023-12-15 NOTE — PROGRESS NOTES
Date of Service: 12/15/2023    [facility] Neurology   MS Clinic Evaluation    Subjective:  53 -year-old man with hypothyroidism, venous insufficiency who presents in follow-up for multiple sclerosis.  He is accompanied by his daughter.     Patient had his last ocreveus infusion in August 2023. He has been tolerating infusions well without allergic reactions or side effects.     Patient reports ongoing left upper extremity numbness and abnormal sensations.  He describes this as BP cuff wrapped around his arm.     He is slight fatigue, which is more than prior to his diagnosis of MS. but he is managing well without having to take rest.     He has some increased frequency of urination/urgency.  Some nights he has to go pee 6 times. The symptoms are happening atleast once a week.     He denies new numbness, weakness, gait or balance issues, vision issues concerning for demyelination.     Did not have any recent infections or fevers.  Did not have any flareups or got hospitalized.    He takes 50 K IUs of vitamin D per week.       Disease onset: age 50, LUE dysesthesias  Last relapse:None  DMD hx:   gilenya 8/2021-12/2022, radiologic progression   ocrevus 1/20/2023- present, LD 2/13/2023    No Known Allergies    Current Outpatient Medications   Medication     folic acid (FOLVITE) 1 MG tablet     levothyroxine (SYNTHROID/LEVOTHROID) 75 MCG tablet     tiZANidine (ZANAFLEX) 2 MG tablet     vitamin D3 (CHOLECALCIFEROL) 1.25 MG (21780 UT) capsule     No current facility-administered medications for this visit.        Past medical, surgical, social and family history was personally reviewed. Pertinent details noted above.     Physical Examination:   /82 (BP Location: Right arm, Patient Position: Sitting, Cuff Size: Adult Large)   Pulse 65   Wt 147.7 kg (325 lb 9.6 oz)   SpO2 96%     General: no acute distress  Cranial nerves:   VFFC  PERRL w/no RAPD  EOM full w/no HEATHER   Face symmetric  Hearing intact  No dysarthria    Motor:   Tone is normal   Bulk is normal     R L  Deltoid  5 5  Biceps  5 5  Triceps 5 5  Wrist ext 5 5  Finger ext 5 5  Finger abd 5 5    Hip flexion 5 5  Knee flexion 5 5  Knee ext 5 5  Ankle d/f 5 5    Reflexes: 2+ and symmetric throughout, babinski absent bilaterally  Sensory: inTact to light touch in all 4 extremities .Romberg is absent  Coordination: no ataxia or dysmetria  Gait: normal base and stride, tandem gait is intact, able to balance on one foot and hop x 5 bilaterally    Tests/Imaging:   CD19 7 to <1    CSF neg ocb  Serum mog neg    CECIL virus Ab positive 1.38  Vitamin D 66  Folate 3 -> 30s     Abs lymph 1100  igg 985    MRI brain   2021 - personal review, multiple periventricular and juxtacortical lesions, gd-   12/2021 - no new lesions, gd-   12/2022 - 3 new lesions, 1 faint gd+  7/2023- no new lesions, gd-     MRI cervical spine   2021 - multiple lesions in cervical cord at the level of c3, left lat/dorsal cord lesion is gd+   12/2021 - no new lesions, gd-, decreased size of lesion at c3  1/2023 - no new lesions, gd-    MRI thoracic spine   2021 - remarkable for 4 lesions, gd-   1/2023 - no new lesions, gd-     Assessment:   #Relapsing-remitting MS  53 -year-old man with hypothyroidism, venous insufficiency who presents in follow-up for relapsing remitting multiple sclerosis.  Patient is clinically and radiologically stable on Ocrevus infusions.  He is tolerating the infusions well without side effects.  Previously his CD19 count was 7 and is down to <1 now.  Patient is 2 months due for his next ocreveus  infusion.  We will check CD19 count to make sure that infusions are still suppressing his immune system.  If he were to have normal B cells, we will consider changing his frequency of infusions from 6 months to 5 months.     Plan:   -CD19 count in January 2023, consider switch to kesimpta if b cells recovering  -Ocreveus infusions in February 2024  -Urology referral if worsening of urinay  frequency and urgency  -MRI brain and spine w/wo contrast in 6 months  -Follow-up in 6 months    Patient was seen with Dr. Mendez.     Note was completed with the assistance of Dragon Fluency software which can often result in accidental word substitutions.     A total of 34 minutes on the date of service were spent in the care of this patient.   Torri Aden MD on 12/15/2023 at 8:12 AM    QIAN Walters, MS  Neurology PGY2    I personally saw and evaluated Mr. Quach with Dr. Aden on the date of service.  I have reviewed the above documentation and made edits where appropriate     A total of 30 minutes were personally spent in the care of this patient on the date of service.     Lorelei Mendez MD on 12/17/2023 at 8:19 AM

## 2023-12-15 NOTE — NURSING NOTE
Chief Complaint   Patient presents with    MS    RECHECK     MS follow up      Vitals were taken and medications were reconciled.   Jesús Patterson, EMT  7:58 AM

## 2023-12-15 NOTE — PATIENT INSTRUCTIONS
B cell count around 1/10   Pay attention to fatigue in the next month     Proceed with ocrevus in February     Reach out if the bladder symptoms are occurring at least once per week -> visit with urology     Mri in 6 months     Follow up after MRI

## 2023-12-15 NOTE — LETTER
12/15/2023       RE: Jace Cristina  [address] [address]       Dear Colleague,    Thank you for referring your patient, Jace Cristina, to the [facility] MULTIPLE SCLEROSIS CLINIC [place] at [facility]. Please see a copy of my visit note below.    Date of Service: 12/15/2023    [facility] Neurology   MS Clinic Evaluation    Subjective:  53 -year-old man with hypothyroidism, venous insufficiency who presents in follow-up for multiple sclerosis.  He is accompanied by his daughter.     Patient had his last ocreveus infusion in August 2023. He has been tolerating infusions well without allergic reactions or side effects.     Patient reports ongoing left upper extremity numbness and abnormal sensations.  He describes this as BP cuff wrapped around his arm.     He is slight fatigue, which is more than prior to his diagnosis of MS. but he is managing well without having to take rest.     He has some increased frequency of urination/urgency.  Some nights he has to go pee 6 times. The symptoms are happening atleast once a week.     He denies new numbness, weakness, gait or balance issues, vision issues concerning for demyelination.     Did not have any recent infections or fevers.  Did not have any flareups or got hospitalized.    He takes 50 K IUs of vitamin D per week.       Disease onset: age 50, LUE dysesthesias  Last relapse:None  DMD hx:   gilenya 8/2021-12/2022, radiologic progression   ocrevus 1/20/2023- present, LD 2/13/2023    No Known Allergies    Current Outpatient Medications   Medication    folic acid (FOLVITE) 1 MG tablet    levothyroxine (SYNTHROID/LEVOTHROID) 75 MCG tablet    tiZANidine (ZANAFLEX) 2 MG tablet    vitamin D3 (CHOLECALCIFEROL) 1.25 MG (17342 UT) capsule     No current facility-administered medications for this visit.        Past medical, surgical, social and family history was personally reviewed. Pertinent details noted  above.     Physical Examination:   /82 (BP Location: Right arm, Patient Position: Sitting, Cuff Size: Adult Large)   Pulse 65   Wt 147.7 kg (325 lb 9.6 oz)   SpO2 96%     General: no acute distress  Cranial nerves:   VFFC  PERRL w/no RAPD  EOM full w/no HEATHER   Face symmetric  Hearing intact  No dysarthria   Motor:   Tone is normal   Bulk is normal     R L  Deltoid  5 5  Biceps  5 5  Triceps 5 5  Wrist ext 5 5  Finger ext 5 5  Finger abd 5 5    Hip flexion 5 5  Knee flexion 5 5  Knee ext 5 5  Ankle d/f 5 5    Reflexes: 2+ and symmetric throughout, babinski absent bilaterally  Sensory: inTact to light touch in all 4 extremities .Romberg is absent  Coordination: no ataxia or dysmetria  Gait: normal base and stride, tandem gait is intact, able to balance on one foot and hop x 5 bilaterally    Tests/Imaging:   CD19 7 to <1    CSF neg ocb  Serum mog neg    CECIL virus Ab positive 1.38  Vitamin D 66  Folate 3 -> 30s     Abs lymph 1100  igg 985    MRI brain   2021 - personal review, multiple periventricular and juxtacortical lesions, gd-   12/2021 - no new lesions, gd-   12/2022 - 3 new lesions, 1 faint gd+  7/2023- no new lesions, gd-     MRI cervical spine   2021 - multiple lesions in cervical cord at the level of c3, left lat/dorsal cord lesion is gd+   12/2021 - no new lesions, gd-, decreased size of lesion at c3  1/2023 - no new lesions, gd-    MRI thoracic spine   2021 - remarkable for 4 lesions, gd-   1/2023 - no new lesions, gd-     Assessment:   #Relapsing-remitting MS  53 -year-old man with hypothyroidism, venous insufficiency who presents in follow-up for relapsing remitting multiple sclerosis.  Patient is clinically and radiologically stable on Ocrevus infusions.  He is tolerating the infusions well without side effects.  Previously his CD19 count was 7 and is down to <1 now.  Patient is 2 months due for his next ocreveus  infusion.  We will check CD19 count to make sure that infusions are still suppressing  his immune system.  If he were to have normal B cells, we will consider changing his frequency of infusions from 6 months to 5 months.     Plan:   -CD19 count in January 2023, consider switch to kesimpta if b cells recovering  -Ocreveus infusions in February 2024  -Urology referral if worsening of urinay frequency and urgency  -MRI brain and spine w/wo contrast in 6 months  -Follow-up in 6 months    Patient was seen with Dr. Mendez.     Note was completed with the assistance of Dragon Fluency software which can often result in accidental word substitutions.     A total of 34 minutes on the date of service were spent in the care of this patient.   Torri Aden MD on 12/15/2023 at 8:12 AM    QIAN Walters, MS  Neurology PGY2    I personally saw and evaluated Mr. Quach with Dr. Aden on the date of service.  I have reviewed the above documentation and made edits where appropriate     A total of 30 minutes were personally spent in the care of this patient on the date of service.           Again, thank you for allowing me to participate in the care of your patient.      Sincerely,    Lorelei Mendez MD

## 2024-01-16 ENCOUNTER — LAB (OUTPATIENT)
Dept: LAB | Facility: CLINIC | Age: 54
End: 2024-01-16
Payer: COMMERCIAL

## 2024-01-16 DIAGNOSIS — Z51.81 THERAPEUTIC DRUG MONITORING: ICD-10-CM

## 2024-01-16 LAB
CD19 B CELL COMMENT: ABNORMAL
CD19 CELLS # BLD: 5 CELLS/UL (ref 107–698)
CD19 CELLS NFR BLD: <1 % (ref 6–27)

## 2024-01-16 PROCEDURE — 36415 COLL VENOUS BLD VENIPUNCTURE: CPT

## 2024-01-16 PROCEDURE — 86355 B CELLS TOTAL COUNT: CPT

## 2024-01-31 ENCOUNTER — DOCUMENTATION ONLY (OUTPATIENT)
Dept: NEUROLOGY | Facility: CLINIC | Age: 54
End: 2024-01-31
Payer: COMMERCIAL

## 2024-01-31 NOTE — PROGRESS NOTES
Authorization for Ocrevus has been received from [facility], approval valid from 02/03/2024 through 02/03/2025 .  Jesús Patterson EMT 01/31/2024 3:06PM

## 2024-02-12 ENCOUNTER — DOCUMENTATION ONLY (OUTPATIENT)
Dept: PHARMACY | Facility: CLINIC | Age: 54
End: 2024-02-12

## 2024-02-12 ENCOUNTER — HOME INFUSION (PRE-WILLOW HOME INFUSION) (OUTPATIENT)
Dept: PHARMACY | Facility: CLINIC | Age: 54
End: 2024-02-12
Payer: COMMERCIAL

## 2024-02-12 DIAGNOSIS — G35 MULTIPLE SCLEROSIS (H): Primary | ICD-10-CM

## 2024-02-12 RX ORDER — ACETAMINOPHEN 325 MG/1
650 TABLET ORAL ONCE
Status: CANCELLED | OUTPATIENT
Start: 2024-08-22

## 2024-02-12 RX ORDER — HEPARIN SODIUM (PORCINE) LOCK FLUSH IV SOLN 100 UNIT/ML 100 UNIT/ML
5 SOLUTION INTRAVENOUS
Status: CANCELLED | OUTPATIENT
Start: 2024-08-22

## 2024-02-12 RX ORDER — EPINEPHRINE 1 MG/ML
0.3 INJECTION, SOLUTION, CONCENTRATE INTRAVENOUS EVERY 5 MIN PRN
Status: CANCELLED | OUTPATIENT
Start: 2024-08-22

## 2024-02-12 RX ORDER — ALBUTEROL SULFATE 90 UG/1
1-2 AEROSOL, METERED RESPIRATORY (INHALATION)
Status: CANCELLED
Start: 2024-08-22

## 2024-02-12 RX ORDER — METHYLPREDNISOLONE SODIUM SUCCINATE 125 MG/2ML
125 INJECTION, POWDER, LYOPHILIZED, FOR SOLUTION INTRAMUSCULAR; INTRAVENOUS
Status: CANCELLED
Start: 2024-08-22

## 2024-02-12 RX ORDER — DIPHENHYDRAMINE HCL 50 MG
50 CAPSULE ORAL ONCE
Status: CANCELLED | OUTPATIENT
Start: 2024-08-22

## 2024-02-12 RX ORDER — DIPHENHYDRAMINE HYDROCHLORIDE 50 MG/ML
50 INJECTION INTRAMUSCULAR; INTRAVENOUS
Status: CANCELLED
Start: 2024-08-22

## 2024-02-12 RX ORDER — ALBUTEROL SULFATE 0.83 MG/ML
2.5 SOLUTION RESPIRATORY (INHALATION)
Status: CANCELLED | OUTPATIENT
Start: 2024-08-22

## 2024-02-12 RX ORDER — METHYLPREDNISOLONE SODIUM SUCCINATE 125 MG/2ML
125 INJECTION, POWDER, LYOPHILIZED, FOR SOLUTION INTRAMUSCULAR; INTRAVENOUS ONCE
Status: CANCELLED
Start: 2024-08-22 | End: 2024-08-22

## 2024-02-12 RX ORDER — MEPERIDINE HYDROCHLORIDE 25 MG/ML
25 INJECTION INTRAMUSCULAR; INTRAVENOUS; SUBCUTANEOUS EVERY 30 MIN PRN
Status: CANCELLED | OUTPATIENT
Start: 2024-08-22

## 2024-02-12 RX ORDER — HEPARIN SODIUM,PORCINE 10 UNIT/ML
5 VIAL (ML) INTRAVENOUS
Status: CANCELLED | OUTPATIENT
Start: 2024-08-22

## 2024-02-12 NOTE — PROGRESS NOTES
Skilled Nurse visit in the [facility] to administer Ocrevus.  No recent elevated temperature, fever, chills, productive cough, coughing for 3 weeks or longer or hemoptysis, abnormal vital signs, night sweats, chest pain. No  decrease in your appetite, unexplained weight loss or fatigue.  No other new onset medical symptoms.  Current weight 311lbs.  Peripheral IV, right Lower Forearm, 1 attempt. Pre medicated with tylenol, benadryl and methyprednisolone. Infusion completed without complication or reaction. Pt reports therapy is effective in managing symptoms related to therapy.

## 2024-04-19 ENCOUNTER — DOCUMENTATION ONLY (OUTPATIENT)
Dept: NEUROLOGY | Facility: CLINIC | Age: 54
End: 2024-04-19
Payer: COMMERCIAL

## 2024-04-19 NOTE — PROGRESS NOTES
Care plan/treatment orders have been received from [facility], orders will be reviewed then placed in Dr. Mendez's folder for signature.   Jesús Patterson EMT April 19, 2024

## 2024-04-30 ENCOUNTER — MEDICAL CORRESPONDENCE (OUTPATIENT)
Dept: HEALTH INFORMATION MANAGEMENT | Facility: CLINIC | Age: 54
End: 2024-04-30
Payer: COMMERCIAL

## 2024-04-30 NOTE — PROGRESS NOTES
Care plan treatment orders have been signed and faxed back at [phone].  Jesús Patterson EMT April 30, 2024

## 2024-05-10 ENCOUNTER — TELEPHONE (OUTPATIENT)
Dept: NEUROLOGY | Facility: CLINIC | Age: 54
End: 2024-05-10
Payer: COMMERCIAL

## 2024-05-10 NOTE — TELEPHONE ENCOUNTER
Left Voicemail (1st Attempt) and Sent Mychart (1st Attempt) for the patient to call back and schedule the following:    Appointment type: Resched June appt  Provider: Dr. Mendez  Return date: next avail  Specialty phone number: [phone]  Additional appointment(s) needed: N/A  Additonal Notes: Reschedules

## 2024-05-13 ENCOUNTER — TELEPHONE (OUTPATIENT)
Dept: NEUROLOGY | Facility: CLINIC | Age: 54
End: 2024-05-13
Payer: COMMERCIAL

## 2024-05-13 NOTE — TELEPHONE ENCOUNTER
Sent Mychart (1st Attempt) and Left Voicemail (2nd Attempt) for the patient to call back and schedule the following:    Appointment type: Resched June appt  Provider: Dr. Mendez  Return date: next avail  Specialty phone number: [phone]  Additional appointment(s) needed: N/A  Additonal Notes: Reschedules

## 2024-05-28 ENCOUNTER — DOCUMENTATION ONLY (OUTPATIENT)
Dept: NEUROLOGY | Facility: CLINIC | Age: 54
End: 2024-05-28
Payer: COMMERCIAL

## 2024-05-28 NOTE — PROGRESS NOTES
Authorization for brain MRI has been received from [facility], approval valid from 05/17/2024 through 07/15/2024.  Jesús Patterson EMT May 28, 2024

## 2024-06-10 ENCOUNTER — HOSPITAL ENCOUNTER (OUTPATIENT)
Dept: MRI IMAGING | Facility: CLINIC | Age: 54
Discharge: HOME | End: 2024-06-10
Attending: PSYCHIATRY & NEUROLOGY
Payer: COMMERCIAL

## 2024-06-10 DIAGNOSIS — G35 MS (MULTIPLE SCLEROSIS) (H): ICD-10-CM

## 2024-06-10 PROCEDURE — 72157 MRI CHEST SPINE W/O & W/DYE: CPT

## 2024-06-10 PROCEDURE — 255N000002 HC RX 255 OP 636: Performed by: PSYCHIATRY & NEUROLOGY

## 2024-06-10 PROCEDURE — 72156 MRI NECK SPINE W/O & W/DYE: CPT

## 2024-06-10 PROCEDURE — A9585 GADOBUTROL INJECTION: HCPCS | Performed by: PSYCHIATRY & NEUROLOGY

## 2024-06-10 RX ORDER — GADOBUTROL 604.72 MG/ML
15 INJECTION INTRAVENOUS ONCE
Status: COMPLETED | OUTPATIENT
Start: 2024-06-10 | End: 2024-06-10

## 2024-06-10 RX ADMIN — GADOBUTROL 15 ML: 604.72 INJECTION INTRAVENOUS at 08:44

## 2024-07-01 ENCOUNTER — HOSPITAL ENCOUNTER (OUTPATIENT)
Dept: MRI IMAGING | Facility: CLINIC | Age: 54
Discharge: HOME | End: 2024-07-01
Attending: PSYCHIATRY & NEUROLOGY | Admitting: PSYCHIATRY & NEUROLOGY
Payer: COMMERCIAL

## 2024-07-01 DIAGNOSIS — G35 MS (MULTIPLE SCLEROSIS) (H): ICD-10-CM

## 2024-07-01 PROCEDURE — 255N000002 HC RX 255 OP 636: Performed by: PSYCHIATRY & NEUROLOGY

## 2024-07-01 PROCEDURE — A9585 GADOBUTROL INJECTION: HCPCS | Performed by: PSYCHIATRY & NEUROLOGY

## 2024-07-01 PROCEDURE — 70553 MRI BRAIN STEM W/O & W/DYE: CPT

## 2024-07-01 RX ORDER — GADOBUTROL 604.72 MG/ML
15 INJECTION INTRAVENOUS ONCE
Status: COMPLETED | OUTPATIENT
Start: 2024-07-01 | End: 2024-07-01

## 2024-07-01 RX ADMIN — GADOBUTROL 15 ML: 604.72 INJECTION INTRAVENOUS at 08:16

## 2024-07-03 ENCOUNTER — OFFICE VISIT (OUTPATIENT)
Dept: NEUROLOGY | Facility: CLINIC | Age: 54
End: 2024-07-03
Attending: PSYCHIATRY & NEUROLOGY
Payer: COMMERCIAL

## 2024-07-03 ENCOUNTER — LAB (OUTPATIENT)
Dept: LAB | Facility: CLINIC | Age: 54
End: 2024-07-03
Payer: COMMERCIAL

## 2024-07-03 VITALS
WEIGHT: 315 LBS | SYSTOLIC BLOOD PRESSURE: 121 MMHG | HEART RATE: 67 BPM | DIASTOLIC BLOOD PRESSURE: 80 MMHG | OXYGEN SATURATION: 95 %

## 2024-07-03 DIAGNOSIS — E53.8 FOLATE DEFICIENCY: ICD-10-CM

## 2024-07-03 DIAGNOSIS — Z51.81 THERAPEUTIC DRUG MONITORING: ICD-10-CM

## 2024-07-03 DIAGNOSIS — G35 MS (MULTIPLE SCLEROSIS) (H): Primary | ICD-10-CM

## 2024-07-03 DIAGNOSIS — G35 MS (MULTIPLE SCLEROSIS) (H): ICD-10-CM

## 2024-07-03 LAB
BASOPHILS # BLD AUTO: 0.1 10E3/UL (ref 0–0.2)
BASOPHILS NFR BLD AUTO: 1 %
CD19 B CELL COMMENT: ABNORMAL
CD19 CELLS # BLD: 1 CELLS/UL (ref 107–698)
CD19 CELLS NFR BLD: <1 % (ref 6–27)
EOSINOPHIL # BLD AUTO: 0.1 10E3/UL (ref 0–0.7)
EOSINOPHIL NFR BLD AUTO: 2 %
ERYTHROCYTE [DISTWIDTH] IN BLOOD BY AUTOMATED COUNT: 12.7 % (ref 10–15)
HCT VFR BLD AUTO: 43.1 % (ref 40–53)
HGB BLD-MCNC: 14.4 G/DL (ref 13.3–17.7)
IGG SERPL-MCNC: 936 MG/DL (ref 610–1616)
IMM GRANULOCYTES # BLD: 0 10E3/UL
IMM GRANULOCYTES NFR BLD: 0 %
LYMPHOCYTES # BLD AUTO: 1.3 10E3/UL (ref 0.8–5.3)
LYMPHOCYTES NFR BLD AUTO: 18 %
MCH RBC QN AUTO: 32.3 PG (ref 26.5–33)
MCHC RBC AUTO-ENTMCNC: 33.4 G/DL (ref 31.5–36.5)
MCV RBC AUTO: 97 FL (ref 78–100)
MONOCYTES # BLD AUTO: 0.8 10E3/UL (ref 0–1.3)
MONOCYTES NFR BLD AUTO: 11 %
NEUTROPHILS # BLD AUTO: 4.7 10E3/UL (ref 1.6–8.3)
NEUTROPHILS NFR BLD AUTO: 68 %
NRBC # BLD AUTO: 0 10E3/UL
NRBC BLD AUTO-RTO: 0 /100
PLATELET # BLD AUTO: 287 10E3/UL (ref 150–450)
RBC # BLD AUTO: 4.46 10E6/UL (ref 4.4–5.9)
WBC # BLD AUTO: 7 10E3/UL (ref 4–11)

## 2024-07-03 PROCEDURE — 99214 OFFICE O/P EST MOD 30 MIN: CPT | Performed by: PSYCHIATRY & NEUROLOGY

## 2024-07-03 PROCEDURE — 86355 B CELLS TOTAL COUNT: CPT | Performed by: PSYCHIATRY & NEUROLOGY

## 2024-07-03 PROCEDURE — 36415 COLL VENOUS BLD VENIPUNCTURE: CPT | Performed by: PATHOLOGY

## 2024-07-03 PROCEDURE — 85025 COMPLETE CBC W/AUTO DIFF WBC: CPT | Performed by: PATHOLOGY

## 2024-07-03 PROCEDURE — 82784 ASSAY IGA/IGD/IGG/IGM EACH: CPT | Performed by: PSYCHIATRY & NEUROLOGY

## 2024-07-03 PROCEDURE — 99000 SPECIMEN HANDLING OFFICE-LAB: CPT | Performed by: PATHOLOGY

## 2024-07-03 RX ORDER — MEPERIDINE HYDROCHLORIDE 25 MG/ML
25 INJECTION INTRAMUSCULAR; INTRAVENOUS; SUBCUTANEOUS EVERY 30 MIN PRN
Status: CANCELLED | OUTPATIENT
Start: 2025-01-25

## 2024-07-03 RX ORDER — HEPARIN SODIUM (PORCINE) LOCK FLUSH IV SOLN 100 UNIT/ML 100 UNIT/ML
5 SOLUTION INTRAVENOUS
Status: CANCELLED | OUTPATIENT
Start: 2025-01-25

## 2024-07-03 RX ORDER — FOLIC ACID 0.8 MG
800 TABLET ORAL DAILY
Qty: 90 TABLET | Refills: 3 | Status: SHIPPED | OUTPATIENT
Start: 2024-07-03

## 2024-07-03 RX ORDER — ALBUTEROL SULFATE 0.83 MG/ML
2.5 SOLUTION RESPIRATORY (INHALATION)
Status: CANCELLED | OUTPATIENT
Start: 2025-01-25

## 2024-07-03 RX ORDER — METHYLPREDNISOLONE SODIUM SUCCINATE 125 MG/2ML
125 INJECTION, POWDER, LYOPHILIZED, FOR SOLUTION INTRAMUSCULAR; INTRAVENOUS ONCE
Status: CANCELLED
Start: 2025-01-25 | End: 2025-01-25

## 2024-07-03 RX ORDER — EPINEPHRINE 1 MG/ML
0.3 INJECTION, SOLUTION, CONCENTRATE INTRAVENOUS EVERY 5 MIN PRN
Status: CANCELLED | OUTPATIENT
Start: 2025-01-25

## 2024-07-03 RX ORDER — DIPHENHYDRAMINE HYDROCHLORIDE 50 MG/ML
50 INJECTION INTRAMUSCULAR; INTRAVENOUS
Status: CANCELLED
Start: 2025-01-25

## 2024-07-03 RX ORDER — HEPARIN SODIUM,PORCINE 10 UNIT/ML
5 VIAL (ML) INTRAVENOUS
Status: CANCELLED | OUTPATIENT
Start: 2025-01-25

## 2024-07-03 RX ORDER — ACETAMINOPHEN 325 MG/1
650 TABLET ORAL ONCE
Status: CANCELLED | OUTPATIENT
Start: 2025-01-25

## 2024-07-03 RX ORDER — DIPHENHYDRAMINE HCL 25 MG
50 CAPSULE ORAL ONCE
Status: CANCELLED | OUTPATIENT
Start: 2025-01-25

## 2024-07-03 RX ORDER — METHYLPREDNISOLONE SODIUM SUCCINATE 125 MG/2ML
125 INJECTION, POWDER, LYOPHILIZED, FOR SOLUTION INTRAMUSCULAR; INTRAVENOUS
Status: CANCELLED
Start: 2025-01-25

## 2024-07-03 RX ORDER — ALBUTEROL SULFATE 90 UG/1
1-2 AEROSOL, METERED RESPIRATORY (INHALATION)
Status: CANCELLED
Start: 2025-01-25

## 2024-07-03 ASSESSMENT — PAIN SCALES - GENERAL: PAINLEVEL: NO PAIN (0)

## 2024-07-03 NOTE — LETTER
"7/3/2024       RE: Jace Cristina  [address] [address]     Dear Colleague,    Thank you for referring your patient, Jace Cristina, to the [facility] MULTIPLE SCLEROSIS CLINIC [place] at [facility]. Please see a copy of my visit note below.    Date of Service: 7/3/2024    [facility] Neurology   MS Clinic Follow-up     Subjective: 53-year-old man with hypothyroidism, venous insufficiency who presents in follow-up for multiple sclerosis.  He is accompanied by his daughter today.    He does not report any new symptoms related to multiple sclerosis.    Will has a sleepy sensation in his left hand.  This has not changed.  He may be more fatigued as he is approaching his infusion.    He did have a fall this last week.  This was related to knee pain.    He continues to receive Ocrevus.  His last dose was given in February.  He needs to schedule his next infusion.  He has not struggled with infections.    He will be moving to [place].    He has an occasional right jaw cramp that fluctuates in severity.  It will often happen if he takes a bite of sandwich or has to open his mouth widely.    He requests a renewal of his folate prescription.    Disease onset: age 50, LUE dysesthesias  Last relapse: \"    DMD hx:   gilenya 8/2021-12/2022, radiologic progression   ocrevus 1/20/2023- present, LD 2/12/2024    No Known Allergies    Current Outpatient Medications   Medication Sig Dispense Refill    folic acid (FOLVITE) 1 MG tablet Take 1 tablet (1,000 mcg) by mouth daily 90 tablet 3    levothyroxine (SYNTHROID/LEVOTHROID) 75 MCG tablet Take 75 mcg by mouth daily      vitamin D3 (CHOLECALCIFEROL) 1.25 MG (98583 UT) capsule Take 1 capsule (50,000 Units) by mouth once a week TAKE 1 CAP BY MOUTH WEEKLY 4 capsule 11    tiZANidine (ZANAFLEX) 2 MG tablet Take 0.5-1 tablets (1-2 mg) by mouth 3 times daily (Patient not taking: Reported on 7/3/2024) 30 tablet 2     No " current facility-administered medications for this visit.        Past medical, surgical, social and family history was personally reviewed. Pertinent details noted above.     Physical Examination:   /80 (BP Location: Right arm, Patient Position: Sitting, Cuff Size: Adult Regular)   Pulse 67   Wt 144.9 kg (319 lb 8 oz)   SpO2 95%     General: no acute distress  Cranial nerves:   VFFC  PERRL w/no RAPD  EOM full w/no HEATHER   Face symmetric  Hearing intact  No dysarthria   Motor:   Tone is normal   Bulk is normal                           R          L  Deltoid             5          5  Biceps             5          5  Triceps            5          5  Wrist ext          5          5  Finger ext        5          5-  Finger abd       5          5     Hip flexion       5          5-  Knee flexion    5          5  Knee ext          5          5  Ankle d/f          5          5     Reflexes: 2+ RUE, 1+ LUE, 1+ achilles, babinski absent bilaterally  Sensory: vibration is mod reduced in the right toe, mild red L toe, JPS normal in the toes   Romberg is absent  Coordination: no ataxia or dysmetria  Gait: normal base and stride, tandem gait is intact, able to balance on one foot for 10 seconds    Tests/Imaging:   CSF neg ocb  Serum mog neg    CECIL virus Ab positive 1.38  Vitamin D 66  Folate 3 -> 30s    Abs lymph 1100-> 1300  igg 985-> 965  Cd19 5    MRI brain   2021 - personal review, multiple periventricular and juxtacortical lesions, gd-   12/2021 - no new lesions, gd-   12/2022 - 3 new lesions, 1 faint gd+  7/2023- no new lesions, gd-   7/2024 - no new lesions    MRI cervical spine   2021 - multiple lesions in cervical cord at the level of c3, left lat/dorsal cord lesion is gd+   12/2021 - no new lesions, gd-, decreased size of lesion at c3  1/2023 - no new lesions, gd-  6/2024 -no new lesions    MRI thoracic spine   2021 - remarkable for 4 lesions, gd-   1/2023 - no new lesions, gd-   6/2024 - no new  lesions    Assessment: 53-year-old man with relapsing remitting multiple sclerosis who appears to be clinically and radiologically stable on ocrelizumab.  He is having some worsening fatigue leading up to his infusion.  Will see if we can get his infusion approved for every 5 months.  Blood work will be done today to assess for return of B cells.    He was encouraged to visit with primary care to discuss his right knee pain.    Plan:   -Blood work for treatment monitoring today  - Continue Ocrevus  - Follow-up in 6 months      Note was completed with the assistance of Dragon Fluency software which can often result in accidental word substitutions.     A total of 30 minutes on the date of service were spent in the care of this patient.   Lorelei Mendez MD on 7/3/2024 at 9:14 AM        Again, thank you for allowing me to participate in the care of your patient.      Sincerely,    Lorelei Mendez MD

## 2024-07-03 NOTE — PROGRESS NOTES
"Date of Service: 7/3/2024    [facility] Neurology   MS Clinic Follow-up     Subjective: 53-year-old man with hypothyroidism, venous insufficiency who presents in follow-up for multiple sclerosis.  He is accompanied by his daughter today.    He does not report any new symptoms related to multiple sclerosis.    Will has a sleepy sensation in his left hand.  This has not changed.  He may be more fatigued as he is approaching his infusion.    He did have a fall this last week.  This was related to knee pain.    He continues to receive Ocrevus.  His last dose was given in February.  He needs to schedule his next infusion.  He has not struggled with infections.    He will be moving to [place].    He has an occasional right jaw cramp that fluctuates in severity.  It will often happen if he takes a bite of sandwich or has to open his mouth widely.    He requests a renewal of his folate prescription.    Disease onset: age 50, LUE dysesthesias  Last relapse: \"    DMD hx:   gilenya 8/2021-12/2022, radiologic progression   ocrevus 1/20/2023- present, LD 2/12/2024    No Known Allergies    Current Outpatient Medications   Medication Sig Dispense Refill    folic acid (FOLVITE) 1 MG tablet Take 1 tablet (1,000 mcg) by mouth daily 90 tablet 3    levothyroxine (SYNTHROID/LEVOTHROID) 75 MCG tablet Take 75 mcg by mouth daily      vitamin D3 (CHOLECALCIFEROL) 1.25 MG (54614 UT) capsule Take 1 capsule (50,000 Units) by mouth once a week TAKE 1 CAP BY MOUTH WEEKLY 4 capsule 11    tiZANidine (ZANAFLEX) 2 MG tablet Take 0.5-1 tablets (1-2 mg) by mouth 3 times daily (Patient not taking: Reported on 7/3/2024) 30 tablet 2     No current facility-administered medications for this visit.        Past medical, surgical, social and family history was personally reviewed. Pertinent details noted above.     Physical Examination:   /80 (BP Location: Right arm, Patient Position: Sitting, Cuff Size: Adult Regular)   Pulse 67   Wt 144.9 kg (319 lb 8 " oz)   SpO2 95%     General: no acute distress  Cranial nerves:   VFFC  PERRL w/no RAPD  EOM full w/no HEATHER   Face symmetric  Hearing intact  No dysarthria   Motor:   Tone is normal   Bulk is normal                           R          L  Deltoid             5          5  Biceps             5          5  Triceps            5          5  Wrist ext          5          5  Finger ext        5          5-  Finger abd       5          5     Hip flexion       5          5-  Knee flexion    5          5  Knee ext          5          5  Ankle d/f          5          5     Reflexes: 2+ RUE, 1+ LUE, 1+ achilles, babinski absent bilaterally  Sensory: vibration is mod reduced in the right toe, mild red L toe, JPS normal in the toes   Romberg is absent  Coordination: no ataxia or dysmetria  Gait: normal base and stride, tandem gait is intact, able to balance on one foot for 10 seconds    Tests/Imaging:   CSF neg ocb  Serum mog neg    CECIL virus Ab positive 1.38  Vitamin D 66  Folate 3 -> 30s    Abs lymph 1100-> 1300  igg 985-> 965  Cd19 5    MRI brain   2021 - personal review, multiple periventricular and juxtacortical lesions, gd-   12/2021 - no new lesions, gd-   12/2022 - 3 new lesions, 1 faint gd+  7/2023- no new lesions, gd-   7/2024 - no new lesions    MRI cervical spine   2021 - multiple lesions in cervical cord at the level of c3, left lat/dorsal cord lesion is gd+   12/2021 - no new lesions, gd-, decreased size of lesion at c3  1/2023 - no new lesions, gd-  6/2024 -no new lesions    MRI thoracic spine   2021 - remarkable for 4 lesions, gd-   1/2023 - no new lesions, gd-   6/2024 - no new lesions    Assessment: 53-year-old man with relapsing remitting multiple sclerosis who appears to be clinically and radiologically stable on ocrelizumab.  He is having some worsening fatigue leading up to his infusion.  Will see if we can get his infusion approved for every 5 months.  Blood work will be done today to assess for return of B  cells.    He was encouraged to visit with primary care to discuss his right knee pain.    Plan:   -Blood work for treatment monitoring today  - Continue Ocrevus  - Follow-up in 6 months      Note was completed with the assistance of Dragon Fluency software which can often result in accidental word substitutions.     A total of 30 minutes on the date of service were spent in the care of this patient.   Lorelei Mendez MD on 7/3/2024 at 9:14 AM

## 2024-07-03 NOTE — NURSING NOTE
Chief Complaint   Patient presents with    MS    RECHECK     MS follow up      Vitals were taken and medications were reconciled.   Jesús Patterson, EMT  8:58 AM

## 2024-07-03 NOTE — PATIENT INSTRUCTIONS
Call to schedule infusion with home infusion     Blood work today     Mri was stable   Exam is stable    See primary care for you knee     Follow up in 6 months

## 2024-07-29 ENCOUNTER — DOCUMENTATION ONLY (OUTPATIENT)
Dept: NEUROLOGY | Facility: CLINIC | Age: 54
End: 2024-07-29
Payer: COMMERCIAL

## 2024-07-29 NOTE — PROGRESS NOTES
Prescriber orders have been received from [facility] for Ocrevus, orders have been placed in Dr. Mendez's folder for review and signature.   Jesús Patterson EMT July 29, 2024

## 2024-07-31 ENCOUNTER — MEDICAL CORRESPONDENCE (OUTPATIENT)
Dept: HEALTH INFORMATION MANAGEMENT | Facility: CLINIC | Age: 54
End: 2024-07-31
Payer: COMMERCIAL

## 2024-08-01 NOTE — PROGRESS NOTES
Prescriber orders for Ocrevus have been signed and faxed back at [phone].  Jesús Patterson EMT August 1, 2024

## 2024-08-02 ENCOUNTER — MEDICAL CORRESPONDENCE (OUTPATIENT)
Dept: HEALTH INFORMATION MANAGEMENT | Facility: CLINIC | Age: 54
End: 2024-08-02
Payer: COMMERCIAL

## 2024-08-02 NOTE — PROGRESS NOTES
New prescriber orders have been received from [facility] to Discontinue Ocrevus, forms placed in Dr. Mendez's folder for review and signature.   Jesús Patterson EMT August 2, 2024

## 2024-08-05 ENCOUNTER — DOCUMENTATION ONLY (OUTPATIENT)
Dept: PHARMACY | Facility: CLINIC | Age: 54
End: 2024-08-05

## 2024-08-05 ENCOUNTER — OFFICE VISIT (OUTPATIENT)
Dept: PHARMACY | Facility: CLINIC | Age: 54
End: 2024-08-05
Payer: COMMERCIAL

## 2024-08-05 DIAGNOSIS — G35 MULTIPLE SCLEROSIS (H): Primary | ICD-10-CM

## 2024-08-05 RX ORDER — EPINEPHRINE 1 MG/ML
0.3 INJECTION, SOLUTION, CONCENTRATE INTRAVENOUS EVERY 5 MIN PRN
OUTPATIENT
Start: 2024-12-08

## 2024-08-05 RX ORDER — METHYLPREDNISOLONE SODIUM SUCCINATE 125 MG/2ML
125 INJECTION, POWDER, LYOPHILIZED, FOR SOLUTION INTRAMUSCULAR; INTRAVENOUS ONCE
Start: 2024-12-08 | End: 2024-12-08

## 2024-08-05 RX ORDER — DIPHENHYDRAMINE HCL 50 MG
50 CAPSULE ORAL ONCE
OUTPATIENT
Start: 2024-12-08

## 2024-08-05 RX ORDER — ALBUTEROL SULFATE 0.83 MG/ML
2.5 SOLUTION RESPIRATORY (INHALATION)
OUTPATIENT
Start: 2024-12-08

## 2024-08-05 RX ORDER — HEPARIN SODIUM,PORCINE 10 UNIT/ML
5 VIAL (ML) INTRAVENOUS
OUTPATIENT
Start: 2024-12-08

## 2024-08-05 RX ORDER — DIPHENHYDRAMINE HYDROCHLORIDE 50 MG/ML
50 INJECTION INTRAMUSCULAR; INTRAVENOUS
Start: 2024-12-08

## 2024-08-05 RX ORDER — METHYLPREDNISOLONE SODIUM SUCCINATE 125 MG/2ML
125 INJECTION, POWDER, LYOPHILIZED, FOR SOLUTION INTRAMUSCULAR; INTRAVENOUS
Start: 2024-12-08

## 2024-08-05 RX ORDER — MEPERIDINE HYDROCHLORIDE 25 MG/ML
25 INJECTION INTRAMUSCULAR; INTRAVENOUS; SUBCUTANEOUS EVERY 30 MIN PRN
OUTPATIENT
Start: 2024-12-08

## 2024-08-05 RX ORDER — ACETAMINOPHEN 325 MG/1
650 TABLET ORAL ONCE
OUTPATIENT
Start: 2024-12-08

## 2024-08-05 RX ORDER — HEPARIN SODIUM (PORCINE) LOCK FLUSH IV SOLN 100 UNIT/ML 100 UNIT/ML
5 SOLUTION INTRAVENOUS
OUTPATIENT
Start: 2024-12-08

## 2024-08-05 RX ORDER — ALBUTEROL SULFATE 90 UG/1
1-2 AEROSOL, METERED RESPIRATORY (INHALATION)
Start: 2024-12-08

## 2024-08-05 NOTE — PROGRESS NOTES
Skilled Nurse visit in the [facility] to administer Ocrevus.  No recent elevated temperature, fever, chills, productive cough, coughing for 3 weeks or longer or hemoptysis, abnormal vital signs, night sweats, chest pain. No  decrease in your appetite, unexplained weight loss or fatigue.  No other new onset medical symptoms.  Current weight 315.2.  Peripheral IVright Lower Forearm, 1attempt Pre medicated with Tylenol,Benadryl and Methylprednisolone. Infusion completed without complication or reaction. Pt reports therapy is effective in managing symptoms related to therapy. Myra Thompson RN

## 2024-08-05 NOTE — PROGRESS NOTES
New prescriber orders have been signed and faxed back at [phone].  Jesús Patterson EMT August 5, 2024

## 2024-08-13 NOTE — PROGRESS NOTES
New prescriber orders to continue with Ocrevus have been received from [facility] infusion, orders placed in Dr. Mendez's folder for review and signature.   Jesús Patterson EMT August 13, 2024

## 2024-08-14 ENCOUNTER — MEDICAL CORRESPONDENCE (OUTPATIENT)
Dept: HEALTH INFORMATION MANAGEMENT | Facility: CLINIC | Age: 54
End: 2024-08-14
Payer: COMMERCIAL

## 2024-08-15 NOTE — PROGRESS NOTES
New prescriber orders for Ocrevus have been signed and faxed back at [phone].  Jesús Patterson EMT August 15, 2024 '

## 2024-09-05 ENCOUNTER — ENROLLMENT (OUTPATIENT)
Dept: HOME HEALTH SERVICES | Facility: HOME HEALTH | Age: 54
End: 2024-09-05
Payer: COMMERCIAL

## 2024-09-16 DIAGNOSIS — E55.9 VITAMIN D DEFICIENCY: ICD-10-CM

## 2024-09-16 NOTE — TELEPHONE ENCOUNTER
Received refill request for vitamin D from [facility]; Patient was last seen in July 2024 and has follow up appointment in Jan 2025 with Dr Mendez. Refilled per MS refill protocol.    Breanna Velazco RN

## 2024-10-23 ENCOUNTER — HOME INFUSION (OUTPATIENT)
Dept: HOME HEALTH SERVICES | Facility: HOME HEALTH | Age: 54
End: 2024-10-23
Payer: COMMERCIAL

## 2024-10-23 VITALS — WEIGHT: 315 LBS

## 2024-10-23 DIAGNOSIS — G35 MULTIPLE SCLEROSIS (H): Primary | ICD-10-CM

## 2024-10-23 RX ORDER — DIPHENHYDRAMINE HCL 25 MG
50 CAPSULE ORAL
Qty: 4 CAPSULE | Refills: 0 | Status: ACTIVE | OUTPATIENT
Start: 2024-10-23 | End: 2025-07-03

## 2024-10-23 RX ORDER — ACETAMINOPHEN 325 MG/1
650 TABLET ORAL
Qty: 4 TABLET | Refills: 0 | Status: ACTIVE | OUTPATIENT
Start: 2024-10-23 | End: 2025-07-03

## 2024-11-17 ENCOUNTER — HEALTH MAINTENANCE LETTER (OUTPATIENT)
Age: 54
End: 2024-11-17

## 2025-01-03 ENCOUNTER — LAB (OUTPATIENT)
Dept: LAB | Facility: CLINIC | Age: 55
End: 2025-01-03
Payer: COMMERCIAL

## 2025-01-03 ENCOUNTER — TELEPHONE (OUTPATIENT)
Dept: NEUROLOGY | Facility: CLINIC | Age: 55
End: 2025-01-03

## 2025-01-03 DIAGNOSIS — G35 MS (MULTIPLE SCLEROSIS) (H): ICD-10-CM

## 2025-01-03 DIAGNOSIS — Z51.81 THERAPEUTIC DRUG MONITORING: ICD-10-CM

## 2025-01-03 LAB
BASOPHILS # BLD AUTO: 0.1 10E3/UL (ref 0–0.2)
BASOPHILS NFR BLD AUTO: 1 %
CD19 B CELL COMMENT: ABNORMAL
CD19 CELLS # BLD: <1 CELLS/UL (ref 107–698)
CD19 CELLS NFR BLD: <1 % (ref 6–27)
EOSINOPHIL # BLD AUTO: 0.1 10E3/UL (ref 0–0.7)
EOSINOPHIL NFR BLD AUTO: 1 %
ERYTHROCYTE [DISTWIDTH] IN BLOOD BY AUTOMATED COUNT: 12.2 % (ref 10–15)
HBV CORE AB SERPL QL IA: NONREACTIVE
HBV SURFACE AG SERPL QL IA: NONREACTIVE
HCT VFR BLD AUTO: 44.1 % (ref 40–53)
HGB BLD-MCNC: 15.2 G/DL (ref 13.3–17.7)
IMM GRANULOCYTES # BLD: 0.1 10E3/UL
IMM GRANULOCYTES NFR BLD: 1 %
LYMPHOCYTES # BLD AUTO: 1.6 10E3/UL (ref 0.8–5.3)
LYMPHOCYTES NFR BLD AUTO: 17 %
MCH RBC QN AUTO: 32.8 PG (ref 26.5–33)
MCHC RBC AUTO-ENTMCNC: 34.5 G/DL (ref 31.5–36.5)
MCV RBC AUTO: 95 FL (ref 78–100)
MONOCYTES # BLD AUTO: 1 10E3/UL (ref 0–1.3)
MONOCYTES NFR BLD AUTO: 11 %
NEUTROPHILS # BLD AUTO: 6.2 10E3/UL (ref 1.6–8.3)
NEUTROPHILS NFR BLD AUTO: 68 %
NRBC # BLD AUTO: 0 10E3/UL
NRBC BLD AUTO-RTO: 0 /100
PLATELET # BLD AUTO: 324 10E3/UL (ref 150–450)
RBC # BLD AUTO: 4.63 10E6/UL (ref 4.4–5.9)
WBC # BLD AUTO: 9.1 10E3/UL (ref 4–11)

## 2025-01-03 PROCEDURE — 86704 HEP B CORE ANTIBODY TOTAL: CPT | Performed by: PSYCHIATRY & NEUROLOGY

## 2025-01-03 PROCEDURE — 82784 ASSAY IGA/IGD/IGG/IGM EACH: CPT | Performed by: PSYCHIATRY & NEUROLOGY

## 2025-01-03 PROCEDURE — 86355 B CELLS TOTAL COUNT: CPT | Performed by: PSYCHIATRY & NEUROLOGY

## 2025-01-03 PROCEDURE — 99000 SPECIMEN HANDLING OFFICE-LAB: CPT | Performed by: PATHOLOGY

## 2025-01-03 PROCEDURE — 87340 HEPATITIS B SURFACE AG IA: CPT | Performed by: PSYCHIATRY & NEUROLOGY

## 2025-01-03 PROCEDURE — 36415 COLL VENOUS BLD VENIPUNCTURE: CPT | Performed by: PATHOLOGY

## 2025-01-03 PROCEDURE — 85025 COMPLETE CBC W/AUTO DIFF WBC: CPT | Performed by: PATHOLOGY

## 2025-01-06 LAB
IGA SERPL-MCNC: 382 MG/DL (ref 84–499)
IGG SERPL-MCNC: 916 MG/DL (ref 610–1616)
IGM SERPL-MCNC: 52 MG/DL (ref 35–242)

## 2025-01-06 NOTE — TELEPHONE ENCOUNTER
PA Initiation    Medication: KESIMPTA 20 MG/0.4ML SC SOAJ  Insurance Company: [facility] Prior Auth Dept, phone  [phone], Fax [phone]  Pharmacy Filling the Rx: [facility] ([facility] #[id number] - [place], [place] - [address] [facility] [facility] [address] [facility]  Filling Pharmacy Phone:    Filling Pharmacy Fax:    Start Date: 1/6/2025          Thank you,    Venecia Higgins [facility]  Specialty Pharmacy Clinic Liaison - CardiologyNeurologyMultiple Sclerosis  [facility]  [address] [place] [id number]  Ph: [phone] Fax: [phone]  [email or web address]

## 2025-01-07 NOTE — TELEPHONE ENCOUNTER
Prior Authorization Approval    Medication: KESIMPTA 20 MG/0.4ML SC SOAJ  Authorization Effective Date: 1/6/2025  Authorization Expiration Date: 1/6/2026  Approved Dose/Quantity: 28 days  Reference #: CMM KEY: [id number]   Insurance Company: [employer] Prior Auth Dept, phone  [phone], Fax [phone]  Expected CoPay: $    CoPay Card Available:      Financial Assistance Needed:   Which Pharmacy is filling the prescription: [facility] ([facility] #[id number] - [address]  Pharmacy Notified: Yes  Patient Notified: Yes        Thank you,    Venecia Higgins [facility]  Specialty Pharmacy Clinic Liaison - CardiologyNeurologyMultiple Sclerosis  [facility] [address]  [address] [place] [address]  Ph: [phone] Fax: [phone]  [email or web address]

## 2025-02-04 ENCOUNTER — EPISODE UPDATE (OUTPATIENT)
Dept: HOME HEALTH SERVICES | Facility: HOME HEALTH | Age: 55
End: 2025-02-04
Payer: COMMERCIAL

## 2025-02-12 ENCOUNTER — DOCUMENTATION ONLY (OUTPATIENT)
Dept: NEUROLOGY | Facility: CLINIC | Age: 55
End: 2025-02-12
Payer: COMMERCIAL

## 2025-02-12 NOTE — PROGRESS NOTES
Authorization for Ocrelizumab has been received from [facility], approval valid from 02/04/2025 through 02/04/2026.  Jesús Patterson EMT February 12, 2025

## 2025-05-12 DIAGNOSIS — E53.8 FOLATE DEFICIENCY: ICD-10-CM

## 2025-05-13 RX ORDER — FOLIC ACID 0.8 MG
800 TABLET ORAL DAILY
Qty: 90 TABLET | Refills: 3 | Status: SHIPPED | OUTPATIENT
Start: 2025-05-13

## 2025-05-13 NOTE — TELEPHONE ENCOUNTER
Pt requesting folic acid rx sent to [facility] in [place], [place]. Pt was last seen in Jan 2025 and has follow-up in July 2025 with Dr Mendez. Refilled per MS refill protocol.    Breanna Velazco RN

## 2025-07-09 ENCOUNTER — OFFICE VISIT (OUTPATIENT)
Dept: NEUROLOGY | Facility: CLINIC | Age: 55
End: 2025-07-09
Attending: PSYCHIATRY & NEUROLOGY
Payer: COMMERCIAL

## 2025-07-09 VITALS
WEIGHT: 305.1 LBS | HEART RATE: 82 BPM | SYSTOLIC BLOOD PRESSURE: 122 MMHG | BODY MASS INDEX: 45.19 KG/M2 | DIASTOLIC BLOOD PRESSURE: 82 MMHG | HEIGHT: 69 IN | OXYGEN SATURATION: 95 %

## 2025-07-09 DIAGNOSIS — G35 MS (MULTIPLE SCLEROSIS) (H): Primary | ICD-10-CM

## 2025-07-09 DIAGNOSIS — E55.9 VITAMIN D DEFICIENCY: ICD-10-CM

## 2025-07-09 DIAGNOSIS — E53.8 FOLATE DEFICIENCY: ICD-10-CM

## 2025-07-09 PROCEDURE — 99214 OFFICE O/P EST MOD 30 MIN: CPT | Mod: GC | Performed by: PSYCHIATRY & NEUROLOGY

## 2025-07-09 PROCEDURE — 1126F AMNT PAIN NOTED NONE PRSNT: CPT | Performed by: PSYCHIATRY & NEUROLOGY

## 2025-07-09 PROCEDURE — 3074F SYST BP LT 130 MM HG: CPT | Performed by: PSYCHIATRY & NEUROLOGY

## 2025-07-09 PROCEDURE — G2211 COMPLEX E/M VISIT ADD ON: HCPCS | Performed by: PSYCHIATRY & NEUROLOGY

## 2025-07-09 PROCEDURE — 3079F DIAST BP 80-89 MM HG: CPT | Performed by: PSYCHIATRY & NEUROLOGY

## 2025-07-09 PROCEDURE — 99214 OFFICE O/P EST MOD 30 MIN: CPT | Performed by: PSYCHIATRY & NEUROLOGY

## 2025-07-09 ASSESSMENT — PAIN SCALES - GENERAL: PAINLEVEL_OUTOF10: NO PAIN (0)

## 2025-07-09 NOTE — LETTER
"7/9/2025       RE: Jace Cristina  [address] [facility] [address] [address]     Dear Colleague,    Thank you for referring your patient, Jace Cristina, to the [facility]LE [facility] [place] at [facility]. Please see a copy of my visit note below.    Date of Service: 7/9/2025    [facility] Neurology   MS Clinic Follow-up     Subjective: 54-year-old man with hypothyroidism, venous insufficiency who presents in follow-up for multiple sclerosis.    Accompanied by wife, Lesa.    Left leg tremoring when getting up after sitting down for awhile. This happens around twice per week. Will tremor for 5-10 seconds.    Some sensation of unsteadiness when starts walking after being seated. Not dizziness or lightheadedness. No visual changes. Improves as he starts walking. Has not fallen.    Kesimpta started in January 2025. Injections going well. Not noticing a similar worsening in the lead up to this as he was with Ocrevus. Bowel and bladder function are at baseline.     Urinary urgency at baseline but no incontinence. No changes in vision.    He denies any limitation of gait and has not had any recent falls.    Disease onset: age 50, LUE dysesthesias  Last relapse: \"    DMD hx:   gilenya 8/2021-12/2022, radiologic progression   ocrevus 1/20/2023-8/5/2024, worsening of symptoms leading up to infusions  Kesimpta - 01/2025-present, last 6/28    No Known Allergies    Current Outpatient Medications   Medication Sig Dispense Refill     folic acid 800 MCG tablet Take 1 tablet (800 mcg) by mouth daily. 90 tablet 3     levothyroxine (SYNTHROID/LEVOTHROID) 75 MCG tablet Take 75 mcg by mouth daily       ofatumumab (KESIMPTA) 20 MG/0.4ML injection Inject 0.4 mLs (20 mg) subcutaneously every 28 (twenty-eight) days. 0.4 mL 11     vitamin D3 (CHOLECALCIFEROL) 1.25 MG (13187 UT) capsule Take 1 capsule (50,000 Units) by mouth once a week. TAKE 1 CAP BY MOUTH WEEKLY 12 capsule " "3     No current facility-administered medications for this visit.        Past medical, surgical, social and family history was personally reviewed. Pertinent details noted above.     Physical Examination:   /82 (BP Location: Left arm, Patient Position: Sitting, Cuff Size: Adult Large)   Pulse 82   Ht 1.753 m (5' 9\")   Wt (!) 138.4 kg (305 lb 1.6 oz)   SpO2 95%   BMI 45.06 kg/m      General: no acute distress  Cranial nerves:   VFFC  PERRL w/no RAPD  EOM full w/no HEATHER   Face symmetric  Hearing intact  No dysarthria   Motor:   Tone is normal   Bulk is normal                           R          L  Deltoid             5          5  Biceps             5          5  Triceps            5          5  Wrist ext          5          5  Finger ext        5          5  Finger abd       5          5     Hip flexion       5          5-  Knee flexion    5          5  Knee ext          5          5  Ankle d/f          5          5     Reflexes: 2+ RUE, 1+ LUE, 1+ achilles, babinski absent bilaterally  Sensory: vibration is mod reduced in the toes, JPS normal in the toes   Romberg is absent though does have mild swaying  Coordination: no ataxia or dysmetria  Gait: normal base and stride, tandem gait is intact, able to balance on one foot for 10 seconds and hop x 5 (mild difficulty with the left foot which he attributes to knee pain)    Tests/Imaging:   CSF neg ocb  Serum mog neg    CECIL virus Ab positive 1.38  Vitamin D 66->82  Folate 3 -> 30s    Abs lymph 1100-> 1300  igg 985-> 936  Cd19 5-> 1    MRI brain   2021 - personal review, multiple periventricular and juxtacortical lesions, gd-   12/2021 - no new lesions, gd-   12/2022 - 3 new lesions, 1 faint gd+  7/2023- no new lesions, gd-   7/2024 - no new lesions  7/2025 - no new lesions    MRI cervical spine   2021 - multiple lesions in cervical cord at the level of c3, left lat/dorsal cord lesion is gd+   12/2021 - no new lesions, gd-, decreased size of lesion at " c3  1/2023 - no new lesions, gd-  6/2024 -no new lesions  7/2025 - no new lesions    MRI thoracic spine   2021 - remarkable for 4 lesions, gd-   1/2023 - no new lesions, gd-   6/2024 - no new lesions  7/2025 - no new lesions    Assessment: 54-year-old man with relapsing remitting multiple sclerosis who appears to be clinically and radiologically stable on Kesimpta. He tolerated the transition from Ocrevus to Kesimpta well and has not had issues with infections.    He has noticed some recent unsteadiness when first standing up to walk; this improves once he starts walking and he has not had falls. No lightheadedness, dizziness, or visual changes with this symptom. No falls. Recommended physical therapy to work on balance and he will explore options for this in [place]. Will also recheck B12 and folate.    Plan:   -Blood work today: CBC, CD19, IgG, B12, folate  -Continue Kesimpta  -MRI in 18 months    Return to clinic in 6 months.    The longitudinal plan of care for the diagnosis(es)/condition(s) as documented were addressed during this visit. Due to the added complexity in care, I will continue to support Luca in the subsequent management and with ongoing continuity of care.    Patient was seen and discussed with staff neurologst Dr. Mendez.    German Wang MD  Multiple Sclerosis Fellow  Department of Neurology    I personally saw and evaluated Mr. Kapadia with Dr. Wang on the date of service.  I have reviewed the above documentation and agree with the findings and recommendations.     A total of 30  minutes were personally spent in the care of this patient on the date of service.     Lorelei Mendez MD on 7/12/2025 at 9:55 AM      Again, thank you for allowing me to participate in the care of your patient.      Sincerely,    Lorelei Mendez MD

## 2025-07-09 NOTE — PATIENT INSTRUCTIONS
Blood work today     Let me know if you find a location for PT in [place]     Continue kesimpta and folate     Follow up in 6 months

## 2025-07-09 NOTE — PROGRESS NOTES
"Date of Service: 7/9/2025    [facility] Neurology   MS Clinic Follow-up     Subjective: 54-year-old man with hypothyroidism, venous insufficiency who presents in follow-up for multiple sclerosis.    Accompanied by wife, Lesa.    Left leg tremoring when getting up after sitting down for awhile. This happens around twice per week. Will tremor for 5-10 seconds.    Some sensation of unsteadiness when starts walking after being seated. Not dizziness or lightheadedness. No visual changes. Improves as he starts walking. Has not fallen.    Kesimpta started in January 2025. Injections going well. Not noticing a similar worsening in the lead up to this as he was with Ocrevus. Bowel and bladder function are at baseline.     Urinary urgency at baseline but no incontinence. No changes in vision.    He denies any limitation of gait and has not had any recent falls.    Disease onset: age 50, LUE dysesthesias  Last relapse: \"    DMD hx:   gilenya 8/2021-12/2022, radiologic progression   ocrevus 1/20/2023-8/5/2024, worsening of symptoms leading up to infusions  Kesimpta - 01/2025-present, last 6/28    No Known Allergies    Current Outpatient Medications   Medication Sig Dispense Refill    folic acid 800 MCG tablet Take 1 tablet (800 mcg) by mouth daily. 90 tablet 3    levothyroxine (SYNTHROID/LEVOTHROID) 75 MCG tablet Take 75 mcg by mouth daily      ofatumumab (KESIMPTA) 20 MG/0.4ML injection Inject 0.4 mLs (20 mg) subcutaneously every 28 (twenty-eight) days. 0.4 mL 11    vitamin D3 (CHOLECALCIFEROL) 1.25 MG (84180 UT) capsule Take 1 capsule (50,000 Units) by mouth once a week. TAKE 1 CAP BY MOUTH WEEKLY 12 capsule 3     No current facility-administered medications for this visit.        Past medical, surgical, social and family history was personally reviewed. Pertinent details noted above.     Physical Examination:   /82 (BP Location: Left arm, Patient Position: Sitting, Cuff Size: Adult Large)   Pulse 82   Ht 1.753 m (5' " "9\")   Wt (!) 138.4 kg (305 lb 1.6 oz)   SpO2 95%   BMI 45.06 kg/m      General: no acute distress  Cranial nerves:   VFFC  PERRL w/no RAPD  EOM full w/no HEATHER   Face symmetric  Hearing intact  No dysarthria   Motor:   Tone is normal   Bulk is normal                           R          L  Deltoid             5          5  Biceps             5          5  Triceps            5          5  Wrist ext          5          5  Finger ext        5          5  Finger abd       5          5     Hip flexion       5          5-  Knee flexion    5          5  Knee ext          5          5  Ankle d/f          5          5     Reflexes: 2+ RUE, 1+ LUE, 1+ achilles, babinski absent bilaterally  Sensory: vibration is mod reduced in the toes, JPS normal in the toes   Romberg is absent though does have mild swaying  Coordination: no ataxia or dysmetria  Gait: normal base and stride, tandem gait is intact, able to balance on one foot for 10 seconds and hop x 5 (mild difficulty with the left foot which he attributes to knee pain)    Tests/Imaging:   CSF neg ocb  Serum mog neg    CECIL virus Ab positive 1.38  Vitamin D 66->82  Folate 3 -> 30s    Abs lymph 1100-> 1300  igg 985-> 936  Cd19 5-> 1    MRI brain   2021 - personal review, multiple periventricular and juxtacortical lesions, gd-   12/2021 - no new lesions, gd-   12/2022 - 3 new lesions, 1 faint gd+  7/2023- no new lesions, gd-   7/2024 - no new lesions  7/2025 - no new lesions    MRI cervical spine   2021 - multiple lesions in cervical cord at the level of c3, left lat/dorsal cord lesion is gd+   12/2021 - no new lesions, gd-, decreased size of lesion at c3  1/2023 - no new lesions, gd-  6/2024 -no new lesions  7/2025 - no new lesions    MRI thoracic spine   2021 - remarkable for 4 lesions, gd-   1/2023 - no new lesions, gd-   6/2024 - no new lesions  7/2025 - no new lesions    Assessment: 54-year-old man with relapsing remitting multiple sclerosis who appears to be clinically and " radiologically stable on Kesimpta. He tolerated the transition from Ocrevus to Kesimpta well and has not had issues with infections.    He has noticed some recent unsteadiness when first standing up to walk; this improves once he starts walking and he has not had falls. No lightheadedness, dizziness, or visual changes with this symptom. No falls. Recommended physical therapy to work on balance and he will explore options for this in [place]. Will also recheck B12 and folate.    Plan:   -Blood work today: CBC, CD19, IgG, B12, folate  -Continue Kesimpta  -MRI in 18 months    Return to clinic in 6 months.    The longitudinal plan of care for the diagnosis(es)/condition(s) as documented were addressed during this visit. Due to the added complexity in care, I will continue to support Luca in the subsequent management and with ongoing continuity of care.    Patient was seen and discussed with staff neurologst Dr. Mendez.    German Wang MD  Multiple Sclerosis Fellow  Department of Neurology    I personally saw and evaluated Mr. Kapadia with Dr. Wang on the date of service.  I have reviewed the above documentation and agree with the findings and recommendations.     A total of 30  minutes were personally spent in the care of this patient on the date of service.     Lorelei Mendez MD on 7/12/2025 at 9:55 AM

## 2025-07-09 NOTE — NURSING NOTE
Chief Complaint   Patient presents with    MS    RECHECK     6 month follow up      Vitals were taken and medications were reconciled.    Jesús Patterson, EMT  1:24 PM

## 2025-07-10 ENCOUNTER — LAB (OUTPATIENT)
Dept: LAB | Facility: CLINIC | Age: 55
End: 2025-07-10
Payer: COMMERCIAL

## 2025-07-10 DIAGNOSIS — G35 MS (MULTIPLE SCLEROSIS) (H): ICD-10-CM

## 2025-07-10 DIAGNOSIS — E53.8 FOLATE DEFICIENCY: ICD-10-CM

## 2025-07-10 LAB
BASOPHILS # BLD AUTO: 0.1 10E3/UL (ref 0–0.2)
BASOPHILS NFR BLD AUTO: 1 %
EOSINOPHIL # BLD AUTO: 0.2 10E3/UL (ref 0–0.7)
EOSINOPHIL NFR BLD AUTO: 2 %
ERYTHROCYTE [DISTWIDTH] IN BLOOD BY AUTOMATED COUNT: 12.6 % (ref 10–15)
FOLATE SERPL-MCNC: 22.9 NG/ML (ref 4.6–34.8)
HCT VFR BLD AUTO: 43.4 % (ref 40–53)
HGB BLD-MCNC: 14.7 G/DL (ref 13.3–17.7)
IMM GRANULOCYTES # BLD: 0 10E3/UL
IMM GRANULOCYTES NFR BLD: 0 %
LYMPHOCYTES # BLD AUTO: 1.7 10E3/UL (ref 0.8–5.3)
LYMPHOCYTES NFR BLD AUTO: 21 %
MCH RBC QN AUTO: 32.5 PG (ref 26.5–33)
MCHC RBC AUTO-ENTMCNC: 33.9 G/DL (ref 31.5–36.5)
MCV RBC AUTO: 96 FL (ref 78–100)
MONOCYTES # BLD AUTO: 0.9 10E3/UL (ref 0–1.3)
MONOCYTES NFR BLD AUTO: 11 %
NEUTROPHILS # BLD AUTO: 5.2 10E3/UL (ref 1.6–8.3)
NEUTROPHILS NFR BLD AUTO: 64 %
PLATELET # BLD AUTO: 343 10E3/UL (ref 150–450)
RBC # BLD AUTO: 4.53 10E6/UL (ref 4.4–5.9)
WBC # BLD AUTO: 8.1 10E3/UL (ref 4–11)